# Patient Record
Sex: MALE | Race: BLACK OR AFRICAN AMERICAN | Employment: UNEMPLOYED | URBAN - METROPOLITAN AREA
[De-identification: names, ages, dates, MRNs, and addresses within clinical notes are randomized per-mention and may not be internally consistent; named-entity substitution may affect disease eponyms.]

---

## 2020-08-31 ENCOUNTER — TELEPHONE (OUTPATIENT)
Dept: INTERNAL MEDICINE CLINIC | Age: 70
End: 2020-08-31

## 2020-08-31 RX ORDER — PRAVASTATIN SODIUM 40 MG/1
40 TABLET ORAL
COMMUNITY
End: 2020-09-02

## 2020-08-31 RX ORDER — FOSINOPRIL SODIUM AND HYDROCHLOROTHIAZIDE 10; 12.5 MG/1; MG/1
TABLET ORAL
COMMUNITY
Start: 2020-05-28 | End: 2020-09-02

## 2020-09-01 ENCOUNTER — TELEPHONE (OUTPATIENT)
Dept: INTERNAL MEDICINE CLINIC | Age: 70
End: 2020-09-01

## 2020-09-01 DIAGNOSIS — E78.2 MIXED HYPERLIPIDEMIA: Primary | ICD-10-CM

## 2020-09-01 NOTE — TELEPHONE ENCOUNTER
Patient called and needs a refill on his pravastiatin 40mg tab sent to [facility] he has an appointment scheduled for 9/18/2020

## 2020-09-02 RX ORDER — PRAVASTATIN SODIUM 40 MG/1
TABLET ORAL
Qty: 90 TAB | Refills: 1 | Status: SHIPPED | OUTPATIENT
Start: 2020-09-02 | End: 2020-09-02 | Stop reason: SDUPTHER

## 2020-09-02 RX ORDER — PRAVASTATIN SODIUM 40 MG/1
40 TABLET ORAL
Qty: 30 TAB | Refills: 3 | Status: SHIPPED | OUTPATIENT
Start: 2020-09-02 | End: 2020-09-18 | Stop reason: SDUPTHER

## 2020-09-02 RX ORDER — FOSINOPRIL SODIUM AND HYDROCHLOROTHIAZIDE 10; 12.5 MG/1; MG/1
TABLET ORAL
Qty: 90 TAB | Refills: 1 | Status: SHIPPED | OUTPATIENT
Start: 2020-09-02 | End: 2020-09-18 | Stop reason: SDUPTHER

## 2020-09-18 ENCOUNTER — VIRTUAL VISIT (OUTPATIENT)
Dept: INTERNAL MEDICINE CLINIC | Age: 70
End: 2020-09-18
Payer: MEDICARE

## 2020-09-18 DIAGNOSIS — Z71.89 ADVANCED CARE PLANNING/COUNSELING DISCUSSION: ICD-10-CM

## 2020-09-18 DIAGNOSIS — E78.2 MIXED HYPERLIPIDEMIA: ICD-10-CM

## 2020-09-18 DIAGNOSIS — I10 ESSENTIAL HYPERTENSION: Primary | ICD-10-CM

## 2020-09-18 DIAGNOSIS — Z00.00 HEALTHCARE MAINTENANCE: ICD-10-CM

## 2020-09-18 PROCEDURE — 99442 PR PHYS/QHP TELEPHONE EVALUATION 11-20 MIN: CPT | Performed by: INTERNAL MEDICINE

## 2020-09-18 RX ORDER — FOSINOPRIL SODIUM AND HYDROCHLOROTHIAZIDE 10; 12.5 MG/1; MG/1
TABLET ORAL
Qty: 90 TAB | Refills: 3 | Status: SHIPPED | OUTPATIENT
Start: 2020-09-18 | End: 2021-10-19 | Stop reason: SDUPTHER

## 2020-09-18 RX ORDER — PRAVASTATIN SODIUM 40 MG/1
40 TABLET ORAL
Qty: 90 TAB | Refills: 3 | Status: SHIPPED | OUTPATIENT
Start: 2020-09-18 | End: 2021-10-19 | Stop reason: SDUPTHER

## 2020-09-18 NOTE — PROGRESS NOTES
I was at my office in [place], [place] while conducting this encounter. The patient is at home. Consent:  Patient and/or HIS/HER healthcare decision maker is aware that this patient-initiated Telehealth encounter is a billable service, with coverage as determined by patient's insurance carrier. Patient is aware that He/She may receive a bill and has provided verbal consent to proceed: Consent has been obtained within past 12 months of the date of this encounter. This virtual visit was conducted via Telephone    1. Essential hypertension  he reports blood pressures been controlled we will renew his Monopril but I am going to check a metabolic panel  - fosinopril-hydroCHLOROthiazide (MONOPRIL-HCT) 10-12.5 mg per tablet; TAKE 1 TABLET BY MOUTH DAILY  Dispense: 90 Tab; Refill: 3    2. Mixed hyperlipidemia  Check a lipid panel and will renew his statin Pravachol 40 mg daily  - LIPID PANEL; Future  - METABOLIC PANEL, COMPREHENSIVE; Future  - pravastatin (PRAVACHOL) 40 mg tablet; Take 1 Tab by mouth nightly. Dispense: 90 Tab; Refill: 3    3. Healthcare maintenance  He is due for both a hemoglobin A1c and a PSA screening. We also discussed the need for colonoscopy. He normally gets them done at the [place]. He is to let us know if he would prefer us to order it or if he is going to have it done at the [place]. Additionally in discussions regarding vaccinations he noted that normally he can get his vaccines free at the [place] and he may pursue this course  - HEMOGLOBIN A1C WITH EAG; Future  - PSA SCREENING (SCREENING)    4. Advanced care planning/counseling discussion  Spent approximately 3 minutes discussing advanced care planning he is a full code       Chief Complaint   Patient presents with    Medication Refill        HPI   This is a very pleasant 38-year-old gentleman with a history of hypertension hyperlipidemia who presents today for routine labs and follow-up. He reports he is doing well he has no complaints at all.   He has no chest pain shortness of breath nausea vomiting constipation diarrhea. He denies any muscle pains or aches and otherwise reports he is in great health    No current outpatient medications on file prior to visit. No current facility-administered medications on file prior to visit.          Patient Active Problem List   Diagnosis Code    Essential hypertension I10    Mixed hyperlipidemia E78.2             Total Time Spent on this Encounter: 12 minutes

## 2020-12-09 ENCOUNTER — TELEPHONE (OUTPATIENT)
Dept: INTERNAL MEDICINE CLINIC | Age: 70
End: 2020-12-09

## 2020-12-09 NOTE — TELEPHONE ENCOUNTER
Patient called and needs a refill on her losartatin 100 mg HTCZ 25 mg sent in to [facility] she has an appt scheduled for 12/15

## 2021-01-07 ENCOUNTER — HOSPITAL ENCOUNTER (OUTPATIENT)
Dept: LAB | Age: 71
Discharge: HOME | End: 2021-01-07

## 2021-01-07 DIAGNOSIS — E78.2 MIXED HYPERLIPIDEMIA: ICD-10-CM

## 2021-01-07 DIAGNOSIS — Z00.00 HEALTHCARE MAINTENANCE: ICD-10-CM

## 2021-01-08 LAB
ALBUMIN SERPL-MCNC: 4.6 G/DL (ref 3.7–4.7)
ALBUMIN/GLOB SERPL: 1.7 {RATIO} (ref 1.2–2.2)
ALP SERPL-CCNC: 59 IU/L (ref 39–117)
ALT SERPL-CCNC: 20 IU/L (ref 0–44)
AST SERPL-CCNC: 21 IU/L (ref 0–40)
BILIRUB SERPL-MCNC: 0.3 MG/DL (ref 0–1.2)
BUN SERPL-MCNC: 10 MG/DL (ref 8–27)
BUN/CREAT SERPL: 11 (ref 10–24)
CALCIUM SERPL-MCNC: 9.3 MG/DL (ref 8.6–10.2)
CHLORIDE SERPL-SCNC: 102 MMOL/L (ref 96–106)
CHOLEST SERPL-MCNC: 132 MG/DL (ref 100–199)
CO2 SERPL-SCNC: 23 MMOL/L (ref 20–29)
CREAT SERPL-MCNC: 0.93 MG/DL (ref 0.76–1.27)
EST. AVERAGE GLUCOSE BLD GHB EST-MCNC: 148 MG/DL
GLOBULIN SER CALC-MCNC: 2.7 G/DL (ref 1.5–4.5)
GLUCOSE SERPL-MCNC: 120 MG/DL (ref 65–99)
HBA1C MFR BLD: 6.8 % (ref 4.8–5.6)
HDLC SERPL-MCNC: 42 MG/DL
LDLC SERPL CALC-MCNC: 79 MG/DL (ref 0–99)
POTASSIUM SERPL-SCNC: 4.2 MMOL/L (ref 3.5–5.2)
PROT SERPL-MCNC: 7.3 G/DL (ref 6–8.5)
PSA SERPL-MCNC: 2.4 NG/ML (ref 0–4)
SODIUM SERPL-SCNC: 139 MMOL/L (ref 134–144)
TRIGL SERPL-MCNC: 48 MG/DL (ref 0–149)
VLDLC SERPL CALC-MCNC: 11 MG/DL (ref 5–40)

## 2021-01-15 ENCOUNTER — VIRTUAL VISIT (OUTPATIENT)
Dept: INTERNAL MEDICINE CLINIC | Age: 71
End: 2021-01-15
Payer: MEDICARE

## 2021-01-15 DIAGNOSIS — E11.65 TYPE 2 DIABETES MELLITUS WITH HYPERGLYCEMIA, WITHOUT LONG-TERM CURRENT USE OF INSULIN (HCC): Primary | ICD-10-CM

## 2021-01-15 PROCEDURE — 99443 PR PHYS/QHP TELEPHONE EVALUATION 21-30 MIN: CPT | Performed by: INTERNAL MEDICINE

## 2021-01-15 RX ORDER — GLIPIZIDE 5 MG/1
2.5 TABLET ORAL DAILY
Qty: 45 TAB | Refills: 1 | Status: SHIPPED | OUTPATIENT
Start: 2021-01-15 | End: 2021-07-21

## 2021-01-15 RX ORDER — GLIPIZIDE 5 MG/1
5 TABLET ORAL DAILY
Qty: 30 TAB | Refills: 2 | Status: SHIPPED | OUTPATIENT
Start: 2021-01-15 | End: 2021-01-15 | Stop reason: CLARIF

## 2021-01-15 NOTE — PROGRESS NOTES
I was at my office in [address] while conducting this encounter. The patient is at home. Consent:  Patient and/or HIS/HER healthcare decision maker is aware that this patient-initiated Telehealth encounter is a billable service, with coverage as determined by patient's insurance carrier. Patient is aware that He/She may receive a bill and has provided verbal consent to proceed: Consent has been obtained within past 12 months of the date of this encounter. This virtual visit was conducted via Telephone    1. Type 2 diabetes mellitus with hyperglycemia, without long-term current use of insulin (HCC)  Surprisingly in our discussions about diabetes the patient reports he had a hemoglobin A1c of 7. He was told his blood sugars were fine and he did not need any treatment. This is very confusing to me. We will go ahead and start low-dose Glucotrol at 2.5 mg daily. I am also can refer to diabetic education. We will check a urine microalbumin as well. He is already on an ACE inhibitor. Also on a statin  - MICROALBUMIN, UR, RAND W/ MICROALB/CREAT RATIO; Future  - REFERRAL TO DIABETIC EDUCATION  - glipiZIDE (GLUCOTROL) 5 mg tablet; Take 0.5 Tabs by mouth daily. Dispense: 45 Tab; Refill: 1       Chief Complaint   Patient presents with    Labs     discuss results        HPI   This is a very pleasant 13-year-old gentleman who presents today in follow-up for hemoglobin A1c of 6.8. He reports that he was told his blood sugars were a little bit high and evidently had a hemoglobin A1c at some point in the 7 range. He was never told he had diabetes. This would be a second elevated hemoglobin A1c thus giving him that diagnosis. He tries to watch his sugar intake and get regular exercise but admits he is not always treated. He denies any increased urinary frequency or increase in thirst.  He reports he has been overall doing really well and has been staying at home and avoiding people with COVID-19.   We had a very long conversation regarding diabetes and its impact on kidney function. I also told him that it was incredibly important to start limiting his sugar intake. Current Outpatient Medications on File Prior to Visit   Medication Sig Dispense Refill    pravastatin (PRAVACHOL) 40 mg tablet Take 1 Tab by mouth nightly. 90 Tab 3    fosinopril-hydroCHLOROthiazide (MONOPRIL-HCT) 10-12.5 mg per tablet TAKE 1 TABLET BY MOUTH DAILY 90 Tab 3     No current facility-administered medications on file prior to visit.          Patient Active Problem List   Diagnosis Code    Essential hypertension I10    Mixed hyperlipidemia E78.2             Total Time Spent on this Encounter: greater than 21 minutes spent

## 2021-01-15 NOTE — PROGRESS NOTES
Jimmy Yost presents today for   Chief Complaint   Patient presents with    Labs     discuss results       Depression Screening:  3 most recent [facility] [place] [address] Screens 1/15/2021   Little interest or pleasure in doing things Not at all   Feeling down, depressed, irritable, or hopeless Not at all   Total Score PHQ 2 0       Learning Assessment:  Learning Assessment 9/18/2020   PRIMARY LEARNER Patient   PRIMARY LANGUAGE ENGLISH   LEARNER PREFERENCE PRIMARY LISTENING   ANSWERED BY Patient   RELATIONSHIP SELF       Fall Risk  Fall Risk Assessment, last 12 mths 1/15/2021   Able to walk? Yes   Fall in past 12 months? 0   Do you feel unsteady? 0   Are you worried about falling 0       ADL  ADL Assessment 1/15/2021   Feeding yourself No Help Needed   Getting from bed to chair No Help Needed   Getting dressed No Help Needed   Bathing or showering No Help Needed   Walk across the room (includes cane/walker) No Help Needed   Using the telphone No Help Needed   Taking your medications No Help Needed   Preparing meals No Help Needed   Managing money (expenses/bills) No Help Needed   Moderately strenuous housework (laundry) No Help Needed   Shopping for personal items (toiletries/medicines) No Help Needed   Shopping for groceries No Help Needed   Driving No Help Needed   Climbing a flight of stairs No Help Needed   Getting to places beyond walking distances No Help Needed       Health Maintenance reviewed and discussed and ordered per Provider.     Health Maintenance Due   Topic Date Due    Hepatitis C Screening  1950    Foot Exam Q1  07/20/1958    MICROALBUMIN Q1  07/20/1958    Eye Exam Retinal or Dilated  07/20/1958    DTaP/Tdap/Td series (1 - Tdap) 07/20/1969    Shingrix Vaccine Age 50> (1 of 2) 07/20/1998    Colorectal Cancer Screening Combo  07/20/1998    GLAUCOMA SCREENING Q2Y  07/20/2013    Pneumococcal 65+ years (1 of 1 - PPSV23) 07/20/2013    Flu Vaccine (1) 09/01/2020    Medicare Yearly Exam  12/22/2020 .      Coordination of Care:  1. Have you been to the ER, urgent care clinic since your last visit? Hospitalized since your last visit? no    2. Have you seen or consulted any other health care providers outside of the [address] [place] [address] since your last visit? Include any pap smears or colon screening.  no

## 2021-03-04 ENCOUNTER — VIRTUAL VISIT (OUTPATIENT)
Dept: INTERNAL MEDICINE CLINIC | Age: 71
End: 2021-03-04

## 2021-03-04 DIAGNOSIS — I10 ESSENTIAL HYPERTENSION: Primary | ICD-10-CM

## 2021-03-04 PROBLEM — E11.9 CONTROLLED TYPE 2 DIABETES MELLITUS, WITHOUT LONG-TERM CURRENT USE OF INSULIN (HCC): Status: ACTIVE | Noted: 2021-03-04

## 2021-03-29 LAB — HBA1C MFR BLD HPLC: 5.7 %

## 2021-05-13 ENCOUNTER — TELEPHONE (OUTPATIENT)
Dept: PHARMACY | Age: 71
End: 2021-05-13

## 2021-05-13 NOTE — TELEPHONE ENCOUNTER
CLINICAL PHARMACY: ADHERENCE REVIEW  Identified care gap per [facility]; fills at [facility]: Statin adherence    Last Visit: 1/15/21    Patient also appears to be prescribed: ACEi, DM    Patient found in Outcomes MTM and is not currently eligible for CMR/TIP    ASSESSMENT  ACE/ARB ADHERENCE    Per Insurance Records through 5/3/21 (2020 [place] = not provided; YTD [place] = 96%; Potential Fail Date: 9/14/21): Fosinopril-HCTZ last filled on 4/10/21 for 90 day supply. Next refill due: 7/8/21    Per Outcomes MTM Records:   Fosinopril-HCTZ 10-12.5mg daily filled on 1/6/21 and 4/10/21 for 90 day supply. Per chart, last rx'd 9/18/20 for #90, 3 refills    BP Readings from Last 3 Encounters:   No data found for BP     CrCl cannot be calculated (Unknown ideal weight.). DIABETES ADHERENCE    Per Insurance Records through 5/3/21 ([address] [place] = not provided; YTD [place] = 99%; Potential Fail Date: 9/21/21):   Glipizide last filled on 4/16/21 for 90 day supply. Next refill due: 7/14/21    Per Outcomes MTM Records:   Glipizide 5mg, half tab daily, filled on 1/15/21 and 4/16/21 for 90 day supply. Per chart, likely 0 refills remain    Lab Results   Component Value Date/Time    Hemoglobin A1c 6.8 (H) 01/07/2021 12:00 AM    Hemoglobin A1c, External 5.7 03/29/2021     STATIN ADHERENCE    Per Insurance Records through 5/3/21 (2020 [place] = not provided; YTD [place] = 76%; Potential Fail Date: 6/16/21):   Pravastatin last filled on 1/6/21 for 90 day supply. Next refill due: 4/5/21    Per Outcomes MTM Records:   Pravastatin 40mg daily filled on 1/6/21 and 5/5/21 for 90 day supply.      Per chart, last rx'd 9/18/20 for #90, 3 refills    Lab Results   Component Value Date/Time    Cholesterol, total 132 01/07/2021 12:00 AM    HDL Cholesterol 42 01/07/2021 12:00 AM    LDL, calculated 79 01/07/2021 12:00 AM    VLDL, calculated 11 01/07/2021 12:00 AM    Triglyceride 48 01/07/2021 12:00 AM     ALT (SGPT)   Date Value Ref Range Status   01/07/2021 20 0 - 44 IU/L Final     AST (SGOT)   Date Value Ref Range Status   2021 21 0 - 40 IU/L Final     The ASCVD Risk score (Kellee Malik, et al., 2013) failed to calculate for the following reasons: The systolic blood pressure is missing     PLAN  The following are interventions that have been identified:  - Patient overdue refilling pravastatin (likely has supply now, but was @1 month past due for refill?)    Reached patient to review. Reports he did recently get more of his pravastatin, and may occasionally run out for a day or two before getting to the pharmacy. Denies ADRs. States he used to take it during the day, but noticed with his January prescription it said to take at night; he takes his other 2 medications earlier in the day. Reviewed that if night-time dosing may be causing him to miss/forget doses, it is best that he gets/takes the medication every day, if a different time works better for his routine. No future appointments.     Franky Pate, PharmD, [address]  Direct: [phone]  Department, toll free: [phone], option 7     =========================================================   For Pharmacy Admin Tracking Only     CPA in place: No   Recommendation Provided To: Patient/Caregiver: 1 via Telephone   Intervention Detail: Adherence Monitorin   Gap Closed?: Yes   Total # of Interventions Recommended: 1   Total # of Interventions Accepted: 1   Intervention Accepted By: Patient/Caregiver: 1   Time Spent (min): 15

## 2021-07-21 DIAGNOSIS — E11.65 TYPE 2 DIABETES MELLITUS WITH HYPERGLYCEMIA, WITHOUT LONG-TERM CURRENT USE OF INSULIN (HCC): ICD-10-CM

## 2021-07-21 RX ORDER — GLIPIZIDE 5 MG/1
TABLET ORAL
Qty: 45 TABLET | Refills: 1 | Status: SHIPPED | OUTPATIENT
Start: 2021-07-21 | End: 2021-09-08 | Stop reason: ALTCHOICE

## 2021-09-08 ENCOUNTER — OFFICE VISIT (OUTPATIENT)
Dept: INTERNAL MEDICINE CLINIC | Age: 71
End: 2021-09-08
Payer: MEDICARE

## 2021-09-08 VITALS
SYSTOLIC BLOOD PRESSURE: 134 MMHG | HEART RATE: 77 BPM | DIASTOLIC BLOOD PRESSURE: 71 MMHG | RESPIRATION RATE: 18 BRPM | WEIGHT: 168.8 LBS | BODY MASS INDEX: 25.58 KG/M2 | HEIGHT: 68 IN | OXYGEN SATURATION: 97 % | TEMPERATURE: 97.6 F

## 2021-09-08 DIAGNOSIS — Z12.5 SPECIAL SCREENING FOR MALIGNANT NEOPLASM OF PROSTATE: ICD-10-CM

## 2021-09-08 DIAGNOSIS — E11.65 CONTROLLED TYPE 2 DIABETES MELLITUS WITH HYPERGLYCEMIA, WITHOUT LONG-TERM CURRENT USE OF INSULIN (HCC): ICD-10-CM

## 2021-09-08 DIAGNOSIS — E78.2 MIXED HYPERLIPIDEMIA: ICD-10-CM

## 2021-09-08 DIAGNOSIS — Z13.39 SCREENING FOR ALCOHOLISM: ICD-10-CM

## 2021-09-08 DIAGNOSIS — Z00.00 MEDICARE ANNUAL WELLNESS VISIT, SUBSEQUENT: ICD-10-CM

## 2021-09-08 DIAGNOSIS — E11.65 TYPE 2 DIABETES MELLITUS WITH HYPERGLYCEMIA, WITHOUT LONG-TERM CURRENT USE OF INSULIN (HCC): Primary | ICD-10-CM

## 2021-09-08 DIAGNOSIS — I10 ESSENTIAL HYPERTENSION: ICD-10-CM

## 2021-09-08 DIAGNOSIS — Z13.31 SCREENING FOR DEPRESSION: ICD-10-CM

## 2021-09-08 DIAGNOSIS — Z11.59 NEED FOR HEPATITIS C SCREENING TEST: ICD-10-CM

## 2021-09-08 LAB
CREATININE, URINE POC: NORMAL MG/DL
HBA1C MFR BLD HPLC: 6.4 %
MICROALBUMIN UR TEST STR-MCNC: NORMAL MG/L
MICROALBUMIN/CREAT RATIO POC: <30 MG/G

## 2021-09-08 PROCEDURE — 99214 OFFICE O/P EST MOD 30 MIN: CPT | Performed by: INTERNAL MEDICINE

## 2021-09-08 PROCEDURE — 3017F COLORECTAL CA SCREEN DOC REV: CPT | Performed by: INTERNAL MEDICINE

## 2021-09-08 PROCEDURE — G8419 CALC BMI OUT NRM PARAM NOF/U: HCPCS | Performed by: INTERNAL MEDICINE

## 2021-09-08 PROCEDURE — 83036 HEMOGLOBIN GLYCOSYLATED A1C: CPT | Performed by: INTERNAL MEDICINE

## 2021-09-08 PROCEDURE — G8536 NO DOC ELDER MAL SCRN: HCPCS | Performed by: INTERNAL MEDICINE

## 2021-09-08 PROCEDURE — 3044F HG A1C LEVEL LT 7.0%: CPT | Performed by: INTERNAL MEDICINE

## 2021-09-08 PROCEDURE — 82044 UR ALBUMIN SEMIQUANTITATIVE: CPT | Performed by: INTERNAL MEDICINE

## 2021-09-08 PROCEDURE — 2022F DILAT RTA XM EVC RTNOPTHY: CPT | Performed by: INTERNAL MEDICINE

## 2021-09-08 PROCEDURE — G8427 DOCREV CUR MEDS BY ELIG CLIN: HCPCS | Performed by: INTERNAL MEDICINE

## 2021-09-08 PROCEDURE — G0439 PPPS, SUBSEQ VISIT: HCPCS | Performed by: INTERNAL MEDICINE

## 2021-09-08 PROCEDURE — 1101F PT FALLS ASSESS-DOCD LE1/YR: CPT | Performed by: INTERNAL MEDICINE

## 2021-09-08 PROCEDURE — G8752 SYS BP LESS 140: HCPCS | Performed by: INTERNAL MEDICINE

## 2021-09-08 PROCEDURE — G8754 DIAS BP LESS 90: HCPCS | Performed by: INTERNAL MEDICINE

## 2021-09-08 PROCEDURE — G8510 SCR DEP NEG, NO PLAN REQD: HCPCS | Performed by: INTERNAL MEDICINE

## 2021-09-08 RX ORDER — GLIPIZIDE 5 MG/1
2.5 TABLET ORAL DAILY
Qty: 30 TABLET | Refills: 5 | Status: SHIPPED | OUTPATIENT
Start: 2021-09-08 | End: 2022-09-06

## 2021-09-08 NOTE — PROGRESS NOTES
1. Medicare annual wellness visit, subsequent  Refer to annual wellness    2. Screening for alcoholism  Refer to annual wellness  - [address] 15 MIN    3. Screening for depression  Refer to annual wellness  - [place] [address]    4. Type 2 diabetes mellitus with hyperglycemia, without long-term current use of insulin [facility])  Mr. Jules Posey and I had a good conversation regarding his diabetes. I do have a reading of 6.8 and today's reading is 6.4. I had him on 2.5 mg of Glucotrol and he stopped taking it. He brought in a reading of 5.7 back in March which I believe was while he was on the Glucotrol. He has never had any hypoglycemic episodes before. I am going to go ahead and restart this. The patient does not tolerate Metformin.  - AMB POC HEMOGLOBIN A1C  - COLLECTION CAPILLARY BLOOD SPECIMEN  - REFERRAL TO PODIATRY  - AMB POC URINE, MICROALBUMIN, SEMIQUANTITATIVE  - glipiZIDE (GLUCOTROL) 5 mg tablet; Take 0.5 Tablets by mouth daily. Dispense: 30 Tablet; Refill: 5    6. Mixed hyperlipidemia  Continue pravastatin. He is due for a lipid profile and a CMP which I have ordered  - METABOLIC PANEL, COMPREHENSIVE  - LIPID PANEL    7. Special screening for malignant neoplasm of prostate  Medicare wellness  - PSA SCREENING (SCREENING)    8. Need for hepatitis C screening test  Medicare wellness  - HEPATITIS C AB    9. Essential Hypertension  Pressures well controlled today  - continue fosinpril/hctz  - CMP    Results for Estefani Snider (MRN [id number]) as of 9/8/2021 11:58   Ref.  Range 1/7/2021 00:00 3/29/2021 00:00 9/8/2021 11:37   Sodium Latest Ref Range: 134 - 144 mmol/L 139     Potassium Latest Ref Range: 3.5 - 5.2 mmol/L 4.2     Chloride Latest Ref Range: 96 - 106 mmol/L 102     CO2 Latest Ref Range: 20 - 29 mmol/L 23     Glucose Latest Ref Range: 65 - 99 mg/dL 120 (H)     BUN Latest Ref Range: 8 - 27 mg/dL 10     Creatinine Latest Ref Range: 0.76 - 1.27 mg/dL 0.93     BUN/Creatinine ratio Latest Ref Range: 10 - 24  11     Calcium Latest Ref Range: 8.6 - 10.2 mg/dL 9.3     GFR est non-AA Latest Ref Range: >59 mL/min/1.73 82     GFR est AA Latest Ref Range: >59 mL/min/1.73 94     Bilirubin, total Latest Ref Range: 0.0 - 1.2 mg/dL 0.3     Protein, total Latest Ref Range: 6.0 - 8.5 g/dL 7.3     Albumin Latest Ref Range: 3.7 - 4.7 g/dL 4.6     A-G Ratio Latest Ref Range: 1.2 - 2.2  1.7     ALT Latest Ref Range: 0 - 44 IU/L 20     AST Latest Ref Range: 0 - 40 IU/L 21     Alk. phosphatase Latest Ref Range: 39 - 117 IU/L 59     Triglyceride Latest Ref Range: 0 - 149 mg/dL 48     Cholesterol, total Latest Ref Range: 100 - 199 mg/dL 132     HDL Cholesterol Latest Ref Range: >39 mg/dL 42     VLDL, calculated Latest Ref Range: 5 - 40 mg/dL 11     LDL, calculated Latest Ref Range: 0 - 99 mg/dL 79     Hemoglobin A1c, (calculated) Latest Ref Range: 4.8 - 5.6 % 6.8 (H)     Hemoglobin A1c (POC) Latest Units: %   6.4   Estimated average glucose Latest Units: mg/dL 148     Prostate Specific Ag Latest Ref Range: 0.0 - 4.0 ng/mL 2.4     Hemoglobin A1c, External Latest Units: %  5.7      Chief Complaint   Patient presents with    Annual Wellness Visit        HPI   This is a very pleasant 41-year-old gentleman with a history of type 2 diabetes dyslipidemia and hypertension who presents for follow-up and for his Medicare wellness. The patient evidently was told by someone to discontinue his Glucotrol many months ago. He had a hemoglobin A1c done at the [place] [address] that was 5.7. After talking to [facility] it was fairly clear that this is while he was on Glucotrol. Repeat hemoglobin A1c in the office is 6.4. Of note he also reports he has been doing well his weight has been stable he has no headache vision changes chest pain palpitation shortness of breath or cough. He has absolutely no complaints.   Patient Active Problem List   Diagnosis Code    Essential hypertension I10    Mixed hyperlipidemia E78.2    Controlled type 2 diabetes mellitus, without long-term current use of insulin (HCC) E11.9        Current Outpatient Medications on File Prior to Visit   Medication Sig Dispense Refill    pravastatin (PRAVACHOL) 40 mg tablet Take 1 Tab by mouth nightly. 90 Tab 3    fosinopril-hydroCHLOROthiazide (MONOPRIL-HCT) 10-12.5 mg per tablet TAKE 1 TABLET BY MOUTH DAILY 90 Tab 3     No current facility-administered medications on file prior to visit. ROS  - GEN: no weight gain/loss, no fevers or chills  - HEENT: no vision changes, no tinnitus, no sore throat  - CV: no cp, palpitations or edema  - RESP: no sob, cough  - ABD: no n/v/d, no blood in stool  - : no dysuria or changes in freq. - SKIN: no rashes, ulcers  - Neuro: no resting tremors, parasthesia in extremities, no headaches  - MS: No weakness in extremities, no gait abnormalities  - Psych: negative for depression or anxiety      Visit Vitals  /71   Pulse 77   Temp 97.6 °F (36.4 °C)   Resp 18   Ht 5' 8\" (1.727 m)   Wt 168 lb 12.8 oz (76.6 kg)   SpO2 97%   BMI 25.67 kg/m²           Physical Exam  Constitutional:       Appearance: Normal appearance. normal. NAD and pleasant  HENT:      Head: Normocephalic. Nose: Nose normal.      Mouth/Throat:      Mouth: Mucous membranes are moist. Throat not inflammed  Eyes:      Extraocular Movements: Extraocular movements intact. Conjunctiva/sclera: Conjunctivae normal. Sclera anicteric       Cardiovascular:      Rate and Rhythm: Normal rate and regular rhythm. Pulses: Normal pulses. Pulmonary:      Effort: No respiratory distress. Breath sounds: CTAB and No stridor. No rhonchi. Abdominal:      General: There is no distension.  NT, ND

## 2021-09-08 NOTE — PROGRESS NOTES
Connor Barone. presents today for   Chief Complaint   Patient presents with    Annual Wellness Visit       Is someone accompanying this pt? no, no (name) no (relationship) (temp no)    Is the patient using any DME equipment during [address]? no    Depression Screening:  3 most recent PHQ Screens 9/8/2021   Little interest or pleasure in doing things Not at all   Feeling down, depressed, irritable, or hopeless Not at all   Total Score PHQ 2 0       Learning Assessment:  Learning Assessment 9/18/2020   PRIMARY LEARNER Patient   PRIMARY LANGUAGE ENGLISH   LEARNER PREFERENCE PRIMARY LISTENING   ANSWERED BY Patient   RELATIONSHIP SELF       Fall Risk  Fall Risk Assessment, last 12 mths 9/8/2021   Able to walk? Yes   Fall in past 12 months? 0   Do you feel unsteady? 0   Are you worried about falling 0       ADL  ADL Assessment 9/8/2021   Feeding yourself No Help Needed   Getting from bed to chair No Help Needed   Getting dressed No Help Needed   Bathing or showering No Help Needed   Walk across the room (includes cane/walker) No Help Needed   Using the telphone No Help Needed   Taking your medications No Help Needed   Preparing meals No Help Needed   Managing money (expenses/bills) No Help Needed   Moderately strenuous housework (laundry) No Help Needed   Shopping for personal items (toiletries/medicines) No Help Needed   Shopping for groceries No Help Needed   Driving No Help Needed   Climbing a flight of stairs No Help Needed   Getting to places beyond walking distances No Help Needed       Health Maintenance reviewed and discussed and ordered per Provider.     Health Maintenance Due   Topic Date Due    Hepatitis C Screening  Never done    Foot Exam Q1  Never done    MICROALBUMIN Q1  Never done    Eye Exam Retinal or Dilated  Never done    COVID-19 Vaccine (1) Never done    DTaP/Tdap/Td series (1 - Tdap) Never done    Colorectal Cancer Screening Combo  Never done    Shingrix Vaccine Age 50> (1 of 2) Never done  Pneumococcal 65+ years (1 of 1 - PPSV23) Never done    Medicare Yearly Exam  Never done    Flu Vaccine (1) Never done   . Coordination of Care:  1. Have you been to the ER, urgent care clinic since your last visit? Hospitalized since your last visit? no    2. Have you seen or consulted any other health care providers outside of the [address] [place] [address] since your last visit? Include any pap smears or colon screening. no     Annual Wellness Exam, performed 12 months or more after the Initial AWV or the last Subsequent AWV    I have reviewed the patient's medical history in detail and updated the computerized patient record. Assessment/Plan   Education and counseling provided:  Are appropriate based on today's review and evaluation    1. Medicare annual wellness visit, subsequent  2. Screening for alcoholism  -     [place] ANNUAL ALCOHOL SCREEN 15 MIN  3. Screening for depression  -     DEPRESSION SCREEN ANNUAL     The patient is due for his colonoscopy and he is in the process of making that appointment with the [place]. He is due for PSA as well as his hep C screen which he states he has never had. He is up-to-date on all vaccinations  Depression Risk Factor Screening:     3 most recent PHQ Screens 9/8/2021   Little interest or pleasure in doing things Not at all   Feeling down, depressed, irritable, or hopeless Not at all   Total Score PHQ 2 0       Alcohol Risk Screen    Do you average more than 1 drink per night or more than 7 drinks a week: No    In the past three months have you have had more than 4 drinks containing alcohol on one occasion: No        Functional Ability and Level of Safety:    Hearing: Hearing is good. Activities of Daily Living: The home contains: no safety equipment. Patient does total self care      Ambulation: with no difficulty     Fall Risk:  Fall Risk Assessment, last 12 mths 9/8/2021   Able to walk? Yes   Fall in past 12 months? 0   Do you feel unsteady?  0   Are you worried about falling 0      Abuse Screen:  Patient is not abused       Cognitive Screening    Has your family/caregiver stated any concerns about your memory: no    Cognitive Screening: Normal - Mini Cog Test    Health Maintenance Due     Health Maintenance Due   Topic Date Due    Hepatitis C Screening  Never done    Foot Exam Q1  Never done    MICROALBUMIN Q1  Never done    Eye Exam Retinal or Dilated  Never done    COVID-19 Vaccine (1) Never done    DTaP/Tdap/Td series (1 - Tdap) Never done    Colorectal Cancer Screening Combo  Never done    Shingrix Vaccine Age 50> (1 of 2) Never done    Pneumococcal 65+ years (1 of 1 - PPSV23) Never done    Flu Vaccine (1) Never done       Patient Care Team   Patient Care Team:  Iesha Nuñez MD as PCP - General (Internal Medicine)  Iesha Nuñez MD as PCP - [facility] Provider    History     Patient Active Problem List   Diagnosis Code    Essential hypertension I10    Mixed hyperlipidemia E78.2    Controlled type 2 diabetes mellitus, without long-term current use of insulin ([facility] E11.9     Past Medical History:   Diagnosis Date    Controlled type 2 diabetes mellitus, without long-term current use of insulin ([facility] 3/4/2021    HTN (hypertension)     Hyperlipidemia       History reviewed. No pertinent surgical history. Current Outpatient Medications   Medication Sig Dispense Refill    glipiZIDE (GLUCOTROL) 5 mg tablet TAKE 1/2 TABLET BY MOUTH DAILY 45 Tablet 1    pravastatin (PRAVACHOL) 40 mg tablet Take 1 Tab by mouth nightly.  90 Tab 3    fosinopril-hydroCHLOROthiazide (MONOPRIL-HCT) 10-12.5 mg per tablet TAKE 1 TABLET BY MOUTH DAILY 90 Tab 3     No Known Allergies    Family History   Problem Relation Age of Onset    Diabetes Mother     Other Father         Meningitis     Social History     Tobacco Use    Smoking status: Never Smoker    Smokeless tobacco: Never Used   Substance Use Topics    Alcohol use: Yes       Ana Hany Savage, who was evaluated through a in office visit and  is aware that it is a billable service, with coverage as determined by his insurance carrier. He provided verbal consent to proceed: Yes, and patient identification was verified. It was conducted pursuant to the emergency declaration under the [address] [place] [address], [address] [place] [address] authority and the [employer] Act. A caregiver was present when appropriate. Ability to conduct physical exam was limited. I was in the office. The patient was in the office.     Mauricio Jara LPN

## 2021-09-08 NOTE — PATIENT INSTRUCTIONS
Medicare Wellness Visit, Male    The best way to live healthy is to have a lifestyle where you eat a well-balanced diet, exercise regularly, limit alcohol use, and quit all forms of tobacco/nicotine, if applicable. Regular preventive services are another way to keep healthy. Preventive services (vaccines, screening tests, monitoring & exams) can help personalize your care plan, which helps you manage your own care. Screening tests can find health problems at the earliest stages, when they are easiest to treat. [facility] follows the current, evidence-based guidelines published by the [facility] ([facility]) when recommending preventive services for our patients. Because we follow these guidelines, sometimes recommendations change over time as research supports it. (For example, a prostate screening blood test is no longer routinely recommended for men with no symptoms). Of course, you and your doctor may decide to screen more often for some diseases, based on your risk and co-morbidities (chronic disease you are already diagnosed with). Preventive services for you include:  - Medicare offers their members a free annual wellness visit, which is time for you and your primary care provider to discuss and plan for your preventive service needs. Take advantage of this benefit every year!  -All adults over age 72 should receive the recommended pneumonia vaccines. Current USPSTF guidelines recommend a series of two vaccines for the best pneumonia protection.   -All adults should have a flu vaccine yearly and tetanus vaccine every 10 years.  -All adults age 48 and older should receive the shingles vaccines (series of two vaccines).        -All adults age 38-68 who are overweight should have a diabetes screening test once every three years.   -Other screening tests & preventive services for persons with diabetes include: an eye exam to screen for diabetic retinopathy, a kidney function test, a foot exam, and stricter control over your cholesterol.   -Cardiovascular screening for adults with routine risk involves an electrocardiogram (ECG) at intervals determined by the provider.   -Colorectal cancer screening should be done for adults age 54-65 with no increased risk factors for colorectal cancer. There are a number of acceptable methods of screening for this type of cancer. Each test has its own benefits and drawbacks. Discuss with your provider what is most appropriate for you during your annual wellness visit. The different tests include: colonoscopy (considered the best screening method), a fecal occult blood test, a fecal DNA test, and sigmoidoscopy.  -All adults born between [facility] should be screened once for Hepatitis C.  -An Abdominal Aortic Aneurysm (AAA) Screening is recommended for men age 73-68 who has ever smoked in their lifetime. Here is a list of your current Health Maintenance items (your personalized list of preventive services) with a due date:  Health Maintenance Due   Topic Date Due    Hepatitis C Test  Never done    Diabetic Foot Care  Never done    Albumin Urine Test  Never done    Eye Exam  Never done    COVID-19 Vaccine (1) Never done    DTaP/Tdap/Td  (1 - Tdap) Never done    Colorectal Screening  Never done    Shingles Vaccine (1 of 2) Never done    Pneumococcal Vaccine (1 of 1 - PPSV23) Never done    Yearly Flu Vaccine (1) Never done     Medicare Wellness Visit, Male    The best way to live healthy is to have a lifestyle where you eat a well-balanced diet, exercise regularly, limit alcohol use, and quit all forms of tobacco/nicotine, if applicable. Regular preventive services are another way to keep healthy. Preventive services (vaccines, screening tests, monitoring & exams) can help personalize your care plan, which helps you manage your own care.  Screening tests can find health problems at the earliest stages, when they are easiest to treat. [facility] follows the current, evidence-based guidelines published by the [facility] ([facility]) when recommending preventive services for our patients. Because we follow these guidelines, sometimes recommendations change over time as research supports it. (For example, a prostate screening blood test is no longer routinely recommended for men with no symptoms). Of course, you and your doctor may decide to screen more often for some diseases, based on your risk and co-morbidities (chronic disease you are already diagnosed with). Preventive services for you include:  - Medicare offers their members a free annual wellness visit, which is time for you and your primary care provider to discuss and plan for your preventive service needs. Take advantage of this benefit every year!  -All adults over age 72 should receive the recommended pneumonia vaccines. Current USPSTF guidelines recommend a series of two vaccines for the best pneumonia protection.   -All adults should have a flu vaccine yearly and tetanus vaccine every 10 years.  -All adults age 48 and older should receive the shingles vaccines (series of two vaccines). -All adults age 38-68 who are overweight should have a diabetes screening test once every three years.   -Other screening tests & preventive services for persons with diabetes include: an eye exam to screen for diabetic retinopathy, a kidney function test, a foot exam, and stricter control over your cholesterol.   -Cardiovascular screening for adults with routine risk involves an electrocardiogram (ECG) at intervals determined by the provider.   -Colorectal cancer screening should be done for adults age 54-65 with no increased risk factors for colorectal cancer. There are a number of acceptable methods of screening for this type of cancer. Each test has its own benefits and drawbacks.  Discuss with your provider what is most appropriate for you during your annual wellness visit. The different tests include: colonoscopy (considered the best screening method), a fecal occult blood test, a fecal DNA test, and sigmoidoscopy.  -All adults born between [facility] should be screened once for Hepatitis C.  -An Abdominal Aortic Aneurysm (AAA) Screening is recommended for men age 73-68 who has ever smoked in their lifetime.      Here is a list of your current Health Maintenance items (your personalized list of preventive services) with a due date:  Health Maintenance Due   Topic Date Due    Hepatitis C Test  Never done    Diabetic Foot Care  Never done    Albumin Urine Test  Never done    Eye Exam  Never done    DTaP/Tdap/Td  (1 - Tdap) Never done    Colorectal Screening  Never done    Shingles Vaccine (1 of 2) Never done    Pneumococcal Vaccine (1 of 1 - PPSV23) Never done    Yearly Flu Vaccine (1) Never done

## 2021-09-28 NOTE — TELEPHONE ENCOUNTER
done Hatchet Flap Text: The defect edges were debeveled with a #15 scalpel blade.  Given the location of the defect, shape of the defect and the proximity to free margins a hatchet flap was deemed most appropriate.  Using a sterile surgical marker, an appropriate hatchet flap was drawn incorporating the defect and placing the expected incisions within the relaxed skin tension lines where possible.    The area thus outlined was incised deep to adipose tissue with a #15 scalpel blade.  The skin margins were undermined to an appropriate distance in all directions utilizing iris scissors.

## 2021-09-29 ENCOUNTER — HOSPITAL ENCOUNTER (OUTPATIENT)
Dept: LAB | Age: 71
Discharge: HOME | End: 2021-09-29

## 2021-10-15 ENCOUNTER — TELEPHONE (OUTPATIENT)
Dept: INTERNAL MEDICINE CLINIC | Age: 71
End: 2021-10-15

## 2021-10-15 DIAGNOSIS — I10 ESSENTIAL HYPERTENSION: ICD-10-CM

## 2021-10-15 NOTE — TELEPHONE ENCOUNTER
Patient requesting refill on RX Fosinopril to [facility], patient stated the Rx requires a prior auth.   Patient stated he is out of the RX

## 2021-10-19 ENCOUNTER — TELEPHONE (OUTPATIENT)
Dept: INTERNAL MEDICINE CLINIC | Age: 71
End: 2021-10-19

## 2021-10-19 DIAGNOSIS — E78.2 MIXED HYPERLIPIDEMIA: ICD-10-CM

## 2021-10-19 RX ORDER — PRAVASTATIN SODIUM 40 MG/1
40 TABLET ORAL
Qty: 90 TABLET | Refills: 1 | Status: SHIPPED | OUTPATIENT
Start: 2021-10-19 | End: 2021-12-27

## 2021-10-19 RX ORDER — FOSINOPRIL SODIUM AND HYDROCHLOROTHIAZIDE 10; 12.5 MG/1; MG/1
TABLET ORAL
Qty: 90 TABLET | Refills: 1 | Status: SHIPPED | OUTPATIENT
Start: 2021-10-19 | End: 2022-04-26 | Stop reason: SDUPTHER

## 2021-12-24 DIAGNOSIS — E78.2 MIXED HYPERLIPIDEMIA: ICD-10-CM

## 2021-12-27 RX ORDER — PRAVASTATIN SODIUM 40 MG/1
TABLET ORAL
Qty: 90 TABLET | Refills: 1 | Status: SHIPPED | OUTPATIENT
Start: 2021-12-27 | End: 2022-03-23 | Stop reason: SDUPTHER

## 2021-12-27 RX ORDER — PRAVASTATIN SODIUM 40 MG/1
TABLET ORAL
Qty: 30 TABLET | Refills: 5 | Status: SHIPPED | OUTPATIENT
Start: 2021-12-27 | End: 2022-05-12 | Stop reason: SDUPTHER

## 2022-03-18 PROBLEM — I10 ESSENTIAL HYPERTENSION: Status: ACTIVE | Noted: 2020-09-18

## 2022-03-19 PROBLEM — E11.9 CONTROLLED TYPE 2 DIABETES MELLITUS, WITHOUT LONG-TERM CURRENT USE OF INSULIN (HCC): Status: ACTIVE | Noted: 2021-03-04

## 2022-03-19 PROBLEM — E78.2 MIXED HYPERLIPIDEMIA: Status: ACTIVE | Noted: 2020-09-18

## 2022-03-23 ENCOUNTER — OFFICE VISIT (OUTPATIENT)
Dept: FAMILY MEDICINE CLINIC | Age: 72
End: 2022-03-23
Payer: MEDICARE

## 2022-03-23 VITALS — SYSTOLIC BLOOD PRESSURE: 129 MMHG | HEART RATE: 75 BPM | OXYGEN SATURATION: 98 % | DIASTOLIC BLOOD PRESSURE: 76 MMHG

## 2022-03-23 DIAGNOSIS — H61.23 BILATERAL IMPACTED CERUMEN: Primary | ICD-10-CM

## 2022-03-23 DIAGNOSIS — E78.2 MIXED HYPERLIPIDEMIA: ICD-10-CM

## 2022-03-23 DIAGNOSIS — E11.65 TYPE 2 DIABETES MELLITUS WITH HYPERGLYCEMIA, WITHOUT LONG-TERM CURRENT USE OF INSULIN (HCC): ICD-10-CM

## 2022-03-23 DIAGNOSIS — I10 ESSENTIAL HYPERTENSION: ICD-10-CM

## 2022-03-23 LAB — HBA1C MFR BLD HPLC: 6.2 %

## 2022-03-23 PROCEDURE — G8419 CALC BMI OUT NRM PARAM NOF/U: HCPCS | Performed by: NURSE PRACTITIONER

## 2022-03-23 PROCEDURE — G8432 DEP SCR NOT DOC, RNG: HCPCS | Performed by: NURSE PRACTITIONER

## 2022-03-23 PROCEDURE — G8536 NO DOC ELDER MAL SCRN: HCPCS | Performed by: NURSE PRACTITIONER

## 2022-03-23 PROCEDURE — 83036 HEMOGLOBIN GLYCOSYLATED A1C: CPT | Performed by: NURSE PRACTITIONER

## 2022-03-23 PROCEDURE — 2022F DILAT RTA XM EVC RTNOPTHY: CPT | Performed by: NURSE PRACTITIONER

## 2022-03-23 PROCEDURE — G8427 DOCREV CUR MEDS BY ELIG CLIN: HCPCS | Performed by: NURSE PRACTITIONER

## 2022-03-23 PROCEDURE — 3017F COLORECTAL CA SCREEN DOC REV: CPT | Performed by: NURSE PRACTITIONER

## 2022-03-23 PROCEDURE — 1101F PT FALLS ASSESS-DOCD LE1/YR: CPT | Performed by: NURSE PRACTITIONER

## 2022-03-23 PROCEDURE — G8752 SYS BP LESS 140: HCPCS | Performed by: NURSE PRACTITIONER

## 2022-03-23 PROCEDURE — 99214 OFFICE O/P EST MOD 30 MIN: CPT | Performed by: NURSE PRACTITIONER

## 2022-03-23 PROCEDURE — 69210 REMOVE IMPACTED EAR WAX UNI: CPT | Performed by: NURSE PRACTITIONER

## 2022-03-23 PROCEDURE — G8754 DIAS BP LESS 90: HCPCS | Performed by: NURSE PRACTITIONER

## 2022-03-23 PROCEDURE — 3044F HG A1C LEVEL LT 7.0%: CPT | Performed by: NURSE PRACTITIONER

## 2022-03-23 RX ORDER — GLIPIZIDE 5 MG/1
2.5 TABLET ORAL DAILY
Qty: 30 TABLET | Refills: 5 | Status: CANCELLED | OUTPATIENT
Start: 2022-03-23

## 2022-03-23 RX ORDER — FOSINOPRIL SODIUM AND HYDROCHLOROTHIAZIDE 10; 12.5 MG/1; MG/1
TABLET ORAL
Qty: 90 TABLET | Refills: 1 | Status: CANCELLED | OUTPATIENT
Start: 2022-03-23

## 2022-03-23 RX ORDER — PRAVASTATIN SODIUM 40 MG/1
40 TABLET ORAL
Qty: 30 TABLET | Refills: 5 | Status: CANCELLED | OUTPATIENT
Start: 2022-03-23

## 2022-03-23 NOTE — PROGRESS NOTES
History of Present Illness  Ana Waite. is a 66 y.o. male who presents today for:    Chief Complaint   Patient presents with   [facility] Establish Care     establish care with provider      Past Medical History  Past Medical History:   Diagnosis Date    Controlled type 2 diabetes mellitus, without long-term current use of insulin ([address] 3/4/2021    HTN (hypertension)     Hyperlipidemia         Surgical History  History reviewed. No pertinent surgical history. Current Medications  Current Outpatient Medications   Medication Sig    pravastatin (PRAVACHOL) 40 mg tablet TAKE 1 TABLET BY MOUTH EVERY NIGHT    fosinopril-hydroCHLOROthiazide (MONOPRIL-HCT) 10-12.5 mg per tablet TAKE 1 TABLET BY MOUTH DAILY    glipiZIDE (GLUCOTROL) 5 mg tablet Take 0.5 Tablets by mouth daily.  pravastatin (PRAVACHOL) 40 mg tablet TAKE 1 TABLET BY MOUTH EVERY DAY (Patient not taking: Reported on 3/23/2022)     No current facility-administered medications for this visit. Allergies/Drug Reactions  No Known Allergies     Family History  Family History   Problem Relation Age of Onset    Diabetes Mother     Other Father         Meningitis        Social History  Social History     Tobacco Use    Smoking status: Never Smoker    Smokeless tobacco: Never Used   Vaping Use    Vaping Use: Never used   Substance Use Topics    Alcohol use:  Yes    Drug use: Not on file        Health Maintenance   Topic Date Due    Foot Exam Q1  Never done    Eye Exam Retinal or Dilated  Never done    DTaP/Tdap/Td series (1 - Tdap) Never done    Colorectal Cancer Screening Combo  Never done    Shingrix Vaccine Age 50> (1 of 2) Never done    Pneumococcal 65+ years (1 of 1 - PPSV23) Never done    Flu Vaccine (1) Never done    Depression Screen  09/08/2022    A1C test (Diabetic or Prediabetic)  09/08/2022    MICROALBUMIN Q1  09/08/2022    Medicare Yearly Exam  09/09/2022    Lipid Screen  09/30/2022    Hepatitis C Screening  Completed    COVID-19 Vaccine  Completed     Immunization History   Administered Date(s) Administered    COVID-19, Moderna Booster, PF, 0.25mL Dose 12/30/2021    COVID-19, Maulik Salinasl, Primary or Immunocompromised Series, MRNA, PF, 100mcg/0.5mL 04/24/2021, 05/22/2021     Physical Exam  Vital signs:   Vitals:    03/23/22 0926   BP: 129/76   Pulse: 75   SpO2: 98%     General: alert, oriented, not in distress  Head: scalp normal, atraumatic  Eyes: pupils are equal and reactive, full and intact EOM's  Ears: patent ear canal, intact tympanic membrane  Nose: normal turbinates, no congestion or discharge  Lips/Mouth: moist lips and buccal mucosa, non-enlarged tonsils, pink throat  Neck: supple, no JVD, no lymphadenopathy, non-palpable thyroid  Chest/Lungs: clear breath sounds, no wheezing or crackles  Heart: normal rate, regular rhythm, no murmur  Abdomen: soft, non-distended, non-tender, normal bowel sounds, no organomegaly, no masses  Extremities: no focal deformities, no edema  Skin: no active skin lesions    Laboratory/Tests:  No visits with results within 3 Month(s) from this visit. Latest known visit with results is:   Abstract on 11/03/2021   Component Date Value Ref Range Status    LDL-C, External 09/30/2021 78   Final    Creatinine, External 09/30/2021 0.95   Final    PSA, External 09/30/2021 2.3   Final     POC A1c=6.2 on 3/23/2022. Physical examination performed:  Bilateral ear tympanic membrane unable to visualize due cerumen impaction. Will perform bilateral ear cerumen disimpaction at this visit. Cardiac auscultation revealed no arrhythmias or murmurs. Bilateral lung sounds clear to auscultation in all fields. Abdomen soft and nontender, bowel sounds normoactive in all 4 quadrants. There was no observed bilateral lower extremity edema. Bilateral ear cerumen disimpaction performed with instrumentation at this visit successfully.   Tympanic membrane visualized on otoscopic examination subsequent to bilateral cerumen disimpaction revealed no abnormalities. Assessment/Plan:    1. Bilateral ear cerumen impaction. Successfully performed bilateral ear cerumen disimpaction with instrumentation at this visit. 2. Essential hypertension. Continue Fosinopril/HCTZ 10/12.5 mg tablet daily for management of essential hypertension. 3. Mixed hyperlipidemia. Continue Pravastatin 40 mg tablet nightly for management of mixed hyperlipidemia. 4. Type 2 diabetes. Continue Glipizide 5 mg tablet, take 1/2 tablet daily for management of type 2 diabetes. POC A1c = 6.2 on 3/23/2022. I have discussed the diagnosis with the patient and the intended plan as seen in the above orders. The patient has received an after-visit summary and questions were answered concerning future plans. I have discussed medication side effects and warnings with the patient as well. I have reviewed the plan of care with the patient, accepted their input and they are in agreement with the treatment goals.        Leonel Dorantes NP  March 23, 2022

## 2022-03-23 NOTE — PROGRESS NOTES
Conybenita Andre presents today for   Chief Complaint   Patient presents with   [facility] Establish Care     establish care with provider        Is someone accompanying this pt? No    Is the patient using any DME equipment during OV? No    Depression Screening:  3 most recent PHQ Screens 3/23/2022   Little interest or pleasure in doing things Not at all   Feeling down, depressed, irritable, or hopeless Not at all   Total Score PHQ 2 0       Learning Assessment:  Learning Assessment 9/18/2020   PRIMARY LEARNER Patient   PRIMARY LANGUAGE ENGLISH   LEARNER PREFERENCE PRIMARY LISTENING   ANSWERED BY Patient   RELATIONSHIP SELF       Fall Risk  Fall Risk Assessment, last 12 mths 3/23/2022   Able to walk? Yes   Fall in past 12 months? 0   Do you feel unsteady? 0   Are you worried about falling 0       ADL  ADL Assessment 9/8/2021   Feeding yourself No Help Needed   Getting from bed to chair No Help Needed   Getting dressed No Help Needed   Bathing or showering No Help Needed   Walk across the room (includes cane/walker) No Help Needed   Using the telphone No Help Needed   Taking your medications No Help Needed   Preparing meals No Help Needed   Managing money (expenses/bills) No Help Needed   Moderately strenuous housework (laundry) No Help Needed   Shopping for personal items (toiletries/medicines) No Help Needed   Shopping for groceries No Help Needed   Driving No Help Needed   Climbing a flight of stairs No Help Needed   Getting to places beyond walking distances No Help Needed       Travel Screening:    Travel Screening     Question   Response    In the last 10 days, have you been in contact with someone who was confirmed or suspected to have Coronavirus/COVID-19? No / Unsure    Have you had a COVID-19 viral test in the last 10 days? No    Do you have any of the following new or worsening symptoms? None of these    Have you traveled internationally or domestically in the last month?   No      Travel History   Travel since 02/23/22    No documented travel since 02/23/22         Health Maintenance reviewed and discussed and ordered per Provider. Health Maintenance Due   Topic Date Due    Foot Exam Q1  Never done    Eye Exam Retinal or Dilated  Never done    DTaP/Tdap/Td series (1 - Tdap) Never done    Colorectal Cancer Screening Combo  Never done    Shingrix Vaccine Age 50> (1 of 2) Never done    Pneumococcal 65+ years (1 of 1 - PPSV23) Never done    Flu Vaccine (1) Never done   . Coordination of Care:  1. \"Have you been to the ER, urgent care clinic since your last visit? Hospitalized since your last visit? \" No    2. \"Have you seen or consulted any other health care providers outside of the [address] [place] [address] since your last visit? \" No     3. For patients aged 39-70: Has the patient had a colonoscopy? Yes - Care Gap present. Most recent result on file     If the patient is female:    4. For patients aged 41-77: Has the patient had a mammogram within the past 2 years? NA - based on age/sex    5. For patients aged 21-65: Has the patient had a pap smear?  NA - based on age/sex

## 2022-04-26 DIAGNOSIS — I10 ESSENTIAL HYPERTENSION: ICD-10-CM

## 2022-04-26 RX ORDER — FOSINOPRIL SODIUM AND HYDROCHLOROTHIAZIDE 10; 12.5 MG/1; MG/1
TABLET ORAL
Qty: 90 TABLET | Refills: 1 | Status: SHIPPED | OUTPATIENT
Start: 2022-04-26 | End: 2022-10-27 | Stop reason: SDUPTHER

## 2022-04-26 NOTE — TELEPHONE ENCOUNTER
Patient has 1 left.     Requested Prescriptions     Pending Prescriptions Disp Refills    fosinopril-hydroCHLOROthiazide (MONOPRIL-HCT) 10-12.5 mg per tablet 90 Tablet 1     Sig: TAKE 1 TABLET BY MOUTH DAILY

## 2022-05-12 DIAGNOSIS — E78.2 MIXED HYPERLIPIDEMIA: ICD-10-CM

## 2022-05-12 RX ORDER — PRAVASTATIN SODIUM 40 MG/1
40 TABLET ORAL
Qty: 30 TABLET | Refills: 5 | Status: SHIPPED | OUTPATIENT
Start: 2022-05-12

## 2022-05-12 NOTE — TELEPHONE ENCOUNTER
Requested Prescriptions     Pending Prescriptions Disp Refills    pravastatin (PRAVACHOL) 40 mg tablet 30 Tablet 5     Sig: Take 1 Tablet by mouth nightly.

## 2022-09-06 DIAGNOSIS — E11.65 TYPE 2 DIABETES MELLITUS WITH HYPERGLYCEMIA, WITHOUT LONG-TERM CURRENT USE OF INSULIN (HCC): ICD-10-CM

## 2022-09-06 RX ORDER — GLIPIZIDE 5 MG/1
TABLET ORAL
Qty: 30 TABLET | Refills: 5 | Status: SHIPPED | OUTPATIENT
Start: 2022-09-06

## 2022-10-27 DIAGNOSIS — I10 ESSENTIAL HYPERTENSION: ICD-10-CM

## 2022-10-28 RX ORDER — FOSINOPRIL SODIUM AND HYDROCHLOROTHIAZIDE 10; 12.5 MG/1; MG/1
TABLET ORAL
Qty: 90 TABLET | Refills: 1 | Status: SHIPPED | OUTPATIENT
Start: 2022-10-28

## 2022-12-15 DIAGNOSIS — E78.2 MIXED HYPERLIPIDEMIA: ICD-10-CM

## 2022-12-15 RX ORDER — PRAVASTATIN SODIUM 40 MG/1
TABLET ORAL
Qty: 30 TABLET | Refills: 5 | Status: SHIPPED | OUTPATIENT
Start: 2022-12-15

## 2023-01-30 ENCOUNTER — TELEPHONE (OUTPATIENT)
Dept: FAMILY MEDICINE CLINIC | Age: 73
End: 2023-01-30

## 2023-01-30 DIAGNOSIS — I10 ESSENTIAL HYPERTENSION: Primary | ICD-10-CM

## 2023-01-30 RX ORDER — LISINOPRIL AND HYDROCHLOROTHIAZIDE 10; 12.5 MG/1; MG/1
1 TABLET ORAL DAILY
Qty: 90 TABLET | Refills: 1 | Status: SHIPPED | OUTPATIENT
Start: 2023-01-30

## 2023-01-30 NOTE — TELEPHONE ENCOUNTER
Pt came into the office stating his fosinopril-hydroCHLOROthiazide (MONOPRIL-HCT) 10-12.5 mg per tablet [[id number]]   Is being discontinued so he will need an alternative medication. He stated the pharmacy had sent a message over, I informed him we didn't receive anything. Pt will need this alternative by tomorrow because he will be out of his other pills. Please be advised.

## 2023-01-31 NOTE — TELEPHONE ENCOUNTER
Ordered Lisinopril/HCTZ 10/12.5 mg tablet daily to replace discontinued Lisinopril/HCTZ 10/12.5 mg tablet daily on 1/30/2023.

## 2023-07-03 RX ORDER — PRAVASTATIN SODIUM 40 MG
TABLET ORAL
Qty: 90 TABLET | Refills: 3 | Status: SHIPPED | OUTPATIENT
Start: 2023-07-03

## 2023-07-28 RX ORDER — LISINOPRIL AND HYDROCHLOROTHIAZIDE 12.5; 1 MG/1; MG/1
1 TABLET ORAL DAILY
Qty: 90 TABLET | Refills: 0 | Status: SHIPPED | OUTPATIENT
Start: 2023-07-28

## 2023-09-05 RX ORDER — GLIPIZIDE 5 MG/1
2.5 TABLET ORAL DAILY
Qty: 30 TABLET | Refills: 0 | Status: SHIPPED | OUTPATIENT
Start: 2023-09-05

## 2023-09-05 RX ORDER — GLIPIZIDE 5 MG/1
TABLET ORAL
Qty: 45 TABLET | OUTPATIENT
Start: 2023-09-05

## 2023-10-06 ENCOUNTER — OFFICE VISIT (OUTPATIENT)
Facility: CLINIC | Age: 73
End: 2023-10-06

## 2023-10-06 VITALS
OXYGEN SATURATION: 100 % | HEART RATE: 86 BPM | DIASTOLIC BLOOD PRESSURE: 71 MMHG | TEMPERATURE: 98.3 F | SYSTOLIC BLOOD PRESSURE: 133 MMHG | HEIGHT: 68 IN | RESPIRATION RATE: 18 BRPM | WEIGHT: 164.6 LBS | BODY MASS INDEX: 24.95 KG/M2

## 2023-10-06 DIAGNOSIS — E78.2 MIXED HYPERLIPIDEMIA: ICD-10-CM

## 2023-10-06 DIAGNOSIS — I10 ESSENTIAL HYPERTENSION: ICD-10-CM

## 2023-10-06 DIAGNOSIS — E11.65 TYPE 2 DIABETES MELLITUS WITH HYPERGLYCEMIA, WITHOUT LONG-TERM CURRENT USE OF INSULIN (HCC): ICD-10-CM

## 2023-10-06 DIAGNOSIS — H61.21 IMPACTED CERUMEN OF RIGHT EAR: ICD-10-CM

## 2023-10-06 DIAGNOSIS — R68.89 OTHER GENERAL SYMPTOMS AND SIGNS: ICD-10-CM

## 2023-10-06 DIAGNOSIS — Z12.5 ENCOUNTER FOR SCREENING FOR MALIGNANT NEOPLASM OF PROSTATE: ICD-10-CM

## 2023-10-06 DIAGNOSIS — Z00.00 MEDICARE ANNUAL WELLNESS VISIT, SUBSEQUENT: Primary | ICD-10-CM

## 2023-10-06 LAB — HBA1C MFR BLD: 6.2 %

## 2023-10-06 RX ORDER — GLIPIZIDE 5 MG/1
2.5 TABLET ORAL DAILY
Qty: 45 TABLET | Refills: 3 | Status: SHIPPED | OUTPATIENT
Start: 2023-10-06

## 2023-10-06 RX ORDER — LISINOPRIL AND HYDROCHLOROTHIAZIDE 12.5; 1 MG/1; MG/1
1 TABLET ORAL DAILY
Qty: 90 TABLET | Refills: 3 | Status: SHIPPED | OUTPATIENT
Start: 2023-10-06

## 2023-10-06 SDOH — ECONOMIC STABILITY: FOOD INSECURITY: WITHIN THE PAST 12 MONTHS, THE FOOD YOU BOUGHT JUST DIDN'T LAST AND YOU DIDN'T HAVE MONEY TO GET MORE.: NEVER TRUE

## 2023-10-06 SDOH — ECONOMIC STABILITY: HOUSING INSECURITY
IN THE LAST 12 MONTHS, WAS THERE A TIME WHEN YOU DID NOT HAVE A STEADY PLACE TO SLEEP OR SLEPT IN A SHELTER (INCLUDING NOW)?: NO

## 2023-10-06 SDOH — ECONOMIC STABILITY: INCOME INSECURITY: HOW HARD IS IT FOR YOU TO PAY FOR THE VERY BASICS LIKE FOOD, HOUSING, MEDICAL CARE, AND HEATING?: NOT VERY HARD

## 2023-10-06 SDOH — ECONOMIC STABILITY: FOOD INSECURITY: WITHIN THE PAST 12 MONTHS, YOU WORRIED THAT YOUR FOOD WOULD RUN OUT BEFORE YOU GOT MONEY TO BUY MORE.: NEVER TRUE

## 2023-10-06 ASSESSMENT — PATIENT HEALTH QUESTIONNAIRE - PHQ9
SUM OF ALL RESPONSES TO PHQ9 QUESTIONS 1 & 2: 0
SUM OF ALL RESPONSES TO PHQ QUESTIONS 1-9: 0
2. FEELING DOWN, DEPRESSED OR HOPELESS: 0
1. LITTLE INTEREST OR PLEASURE IN DOING THINGS: 0
SUM OF ALL RESPONSES TO PHQ QUESTIONS 1-9: 0

## 2023-10-06 ASSESSMENT — LIFESTYLE VARIABLES
HOW OFTEN DO YOU HAVE A DRINK CONTAINING ALCOHOL: MONTHLY OR LESS
HOW MANY STANDARD DRINKS CONTAINING ALCOHOL DO YOU HAVE ON A TYPICAL DAY: 1 OR 2

## 2023-10-06 NOTE — PROGRESS NOTES
Sindy Sam. presents today for No chief complaint on file. Is someone accompanying this pt? no    Is the patient using any DME equipment during OV? no    Health Maintenance reviewed and discussed and ordered per Provider. Health Maintenance Due   Topic Date Due    Pneumococcal 65+ years Vaccine (1 - PCV) Never done    Diabetic foot exam  Never done    Diabetic retinal exam  Never done    DTaP/Tdap/Td vaccine (1 - Tdap) Never done    Colorectal Cancer Screen  Never done    Shingles vaccine (1 of 2) Never done    Hepatitis B vaccine (1 of 3 - Risk 3-dose series) Never done    COVID-19 Vaccine (4 - Moderna series) 02/24/2022    Annual Wellness Visit (AWV)  09/09/2022    Lipids  09/30/2022    A1C test (Diabetic or Prediabetic)  03/23/2023    Depression Screen  03/23/2023    Flu vaccine (1) Never done   . Coordination of Care:  1. \"Have you been to the ER, urgent care clinic since your last visit? Hospitalized since your last visit? \" No    2. \"Have you seen or consulted any other health care providers outside of the [address] [place] [address] since your last visit? \" No    3. For patients aged 43-73: Has the patient had a colonoscopy? No    If the patient is female:    4. For patients aged 43-66: Has the patient had a mammogram within the past 2 years? N/A based on age/sex    5. For patients aged 21-65: Has the patient had a pap smear?  N/A based on age/sex

## 2023-10-06 NOTE — PROGRESS NOTES
History of Present Illness  Sepideh Jones is a 74 y.o. male who presents today for:    Chief Complaint   Patient presents with    Annual Exam       Past Medical History  Past Medical History:   Diagnosis Date    Controlled type 2 diabetes mellitus, without long-term current use of insulin ([address]) 3/4/2021    HTN (hypertension)     Hyperlipidemia         Surgical History  No past surgical history on file. Current Medications  Current Outpatient Medications   Medication Sig    glipiZIDE (GLUCOTROL) 5 MG tablet Take 0.5 tablets by mouth daily    lisinopril-hydroCHLOROthiazide (PRINZIDE;ZESTORETIC) 10-12.5 MG per tablet Take 1 tablet by mouth daily    pravastatin (PRAVACHOL) 40 MG tablet TAKE 1 TABLET BY MOUTH EVERY NIGHT     No current facility-administered medications for this visit.        Allergies/Drug Reactions  No Known Allergies     Family History  Family History   Problem Relation Age of Onset    Other Father         Meningitis    Diabetes Mother         Social History  Social History     Tobacco Use    Smoking status: Never    Smokeless tobacco: Never   Substance Use Topics    Alcohol use: Yes        Health Maintenance   Topic Date Due    Pneumococcal 65+ years Vaccine (1 - PCV) Never done    Diabetic foot exam  Never done    Diabetic retinal exam  Never done    DTaP/Tdap/Td vaccine (1 - Tdap) Never done    Colorectal Cancer Screen  Never done    Shingles vaccine (1 of 2) Never done    Hepatitis B vaccine (1 of 3 - Risk 3-dose series) Never done    COVID-19 Vaccine (4 - Moderna series) 02/24/2022    Annual Wellness Visit (AWV)  09/09/2022    Lipids  09/30/2022    Depression Screen  03/23/2023    Flu vaccine (1) 10/06/2024 (Originally 8/1/2023)    A1C test (Diabetic or Prediabetic)  10/06/2024    Hepatitis C screen  Completed    Hepatitis A vaccine  Aged Out    Hib vaccine  Aged Out    Meningococcal (ACWY) vaccine  Aged Out    Diabetic Alb to Cr ratio (uACR) test  Discontinued    GFR test (Diabetes, CKD

## 2024-04-08 ENCOUNTER — OFFICE VISIT (OUTPATIENT)
Facility: CLINIC | Age: 74
End: 2024-04-08
Payer: COMMERCIAL

## 2024-04-08 VITALS
HEART RATE: 75 BPM | DIASTOLIC BLOOD PRESSURE: 70 MMHG | TEMPERATURE: 97.6 F | RESPIRATION RATE: 18 BRPM | WEIGHT: 167.4 LBS | BODY MASS INDEX: 25.37 KG/M2 | SYSTOLIC BLOOD PRESSURE: 136 MMHG | HEIGHT: 68 IN | OXYGEN SATURATION: 100 %

## 2024-04-08 DIAGNOSIS — E11.65 TYPE 2 DIABETES MELLITUS WITH HYPERGLYCEMIA, WITHOUT LONG-TERM CURRENT USE OF INSULIN (HCC): ICD-10-CM

## 2024-04-08 DIAGNOSIS — Z12.12 ENCOUNTER FOR COLORECTAL CANCER SCREENING: ICD-10-CM

## 2024-04-08 DIAGNOSIS — Z12.5 ENCOUNTER FOR SCREENING FOR MALIGNANT NEOPLASM OF PROSTATE: ICD-10-CM

## 2024-04-08 DIAGNOSIS — Z12.11 ENCOUNTER FOR COLORECTAL CANCER SCREENING: ICD-10-CM

## 2024-04-08 DIAGNOSIS — R68.89 OTHER GENERAL SYMPTOMS AND SIGNS: ICD-10-CM

## 2024-04-08 DIAGNOSIS — I10 ESSENTIAL HYPERTENSION: Primary | ICD-10-CM

## 2024-04-08 DIAGNOSIS — H61.23 BILATERAL IMPACTED CERUMEN: ICD-10-CM

## 2024-04-08 DIAGNOSIS — E78.2 MIXED HYPERLIPIDEMIA: ICD-10-CM

## 2024-04-08 PROCEDURE — 1123F ACP DISCUSS/DSCN MKR DOCD: CPT | Performed by: NURSE PRACTITIONER

## 2024-04-08 PROCEDURE — 3078F DIAST BP <80 MM HG: CPT | Performed by: NURSE PRACTITIONER

## 2024-04-08 PROCEDURE — 99214 OFFICE O/P EST MOD 30 MIN: CPT | Performed by: NURSE PRACTITIONER

## 2024-04-08 PROCEDURE — 3075F SYST BP GE 130 - 139MM HG: CPT | Performed by: NURSE PRACTITIONER

## 2024-04-08 PROCEDURE — 69210 REMOVE IMPACTED EAR WAX UNI: CPT | Performed by: NURSE PRACTITIONER

## 2024-04-08 ASSESSMENT — PATIENT HEALTH QUESTIONNAIRE - PHQ9
SUM OF ALL RESPONSES TO PHQ QUESTIONS 1-9: 0
SUM OF ALL RESPONSES TO PHQ9 QUESTIONS 1 & 2: 0
SUM OF ALL RESPONSES TO PHQ QUESTIONS 1-9: 0
2. FEELING DOWN, DEPRESSED OR HOPELESS: NOT AT ALL
SUM OF ALL RESPONSES TO PHQ QUESTIONS 1-9: 0
1. LITTLE INTEREST OR PLEASURE IN DOING THINGS: NOT AT ALL
SUM OF ALL RESPONSES TO PHQ QUESTIONS 1-9: 0

## 2024-04-08 NOTE — PROGRESS NOTES
Sepideh Morrissey Jr. presents today for   Chief Complaint   Patient presents with    Follow-up     6m follow up        Is someone accompanying this pt? no    Is the patient using any DME equipment during OV? no    Health Maintenance reviewed and discussed and ordered per Provider.    Health Maintenance Due   Topic Date Due    Pneumococcal 65+ years Vaccine (1 of 2 - PCV) Never done    Diabetic foot exam  Never done    Diabetic retinal exam  Never done    DTaP/Tdap/Td vaccine (1 - Tdap) Never done    Colorectal Cancer Screen  Never done    Shingles vaccine (1 of 2) Never done    Respiratory Syncytial Virus (RSV) Pregnant or age 60 yrs+ (1 - 1-dose 60+ series) Never done    Lipids  09/30/2022    COVID-19 Vaccine (4 - 2023-24 season) 09/01/2023   .      \"Have you been to the ER, urgent care clinic since your last visit?  Hospitalized since your last visit?\"    NO    “Have you seen or consulted any other health care providers outside of [facility] since your last visit?”    NO    “Have you had a colorectal cancer screening such as a colonoscopy/FIT/Cologuard?    NO    No colonoscopy on file  No cologuard on file  No FIT/FOBT on file   No flexible sigmoidoscopy on file              
performed bilateral ear cerumen disimpaction (Gentle forceps) at this visit.  Encounter for colorectal cancer screening.  Ordered Cologuard at this visit.      I have discussed the diagnosis with the patient and the intended plan as seen in the above orders.  The patient has received an after-visit summary and questions were answered concerning future plans.  I have discussed medication side effects and warnings with the patient as well. I have reviewed the plan of care with the patient, accepted their input and they are in agreement with the treatment goals.       Negrito Manley, APRN - NP  April 8, 2024

## 2024-04-12 ENCOUNTER — HOSPITAL ENCOUNTER (OUTPATIENT)
Age: 74
Discharge: HOME | End: 2024-04-15

## 2024-04-12 LAB — LABCORP DRAW FEE: NORMAL

## 2024-04-12 PROCEDURE — 99001 SPECIMEN HANDLING PT-LAB: CPT

## 2024-04-13 LAB
ALBUMIN SERPL-MCNC: 4.8 G/DL (ref 3.8–4.8)
ALBUMIN/CREAT UR: 2 MG/G CREAT (ref 0–29)
ALBUMIN/GLOB SERPL: 1.8 {RATIO} (ref 1.2–2.2)
ALP SERPL-CCNC: 61 IU/L (ref 44–121)
ALT SERPL-CCNC: 22 IU/L (ref 0–44)
AST SERPL-CCNC: 17 IU/L (ref 0–40)
BASOPHILS # BLD AUTO: 0 X10E3/UL (ref 0–0.2)
BASOPHILS NFR BLD AUTO: 1 %
BILIRUB SERPL-MCNC: 0.3 MG/DL (ref 0–1.2)
BUN SERPL-MCNC: 9 MG/DL (ref 8–27)
BUN/CREAT SERPL: 10 (ref 10–24)
CALCIUM SERPL-MCNC: 9.2 MG/DL (ref 8.6–10.2)
CHLORIDE SERPL-SCNC: 102 MMOL/L (ref 96–106)
CHOLEST SERPL-MCNC: 126 MG/DL (ref 100–199)
CO2 SERPL-SCNC: 20 MMOL/L (ref 20–29)
CREAT SERPL-MCNC: 0.91 MG/DL (ref 0.76–1.27)
CREAT UR-MCNC: 139.8 MG/DL
EGFRCR SERPLBLD CKD-EPI 2021: 88 ML/MIN/1.73
EOSINOPHIL # BLD AUTO: 0 X10E3/UL (ref 0–0.4)
EOSINOPHIL NFR BLD AUTO: 1 %
ERYTHROCYTE [DISTWIDTH] IN BLOOD BY AUTOMATED COUNT: 12.9 % (ref 11.6–15.4)
GLOBULIN SER CALC-MCNC: 2.7 G/DL (ref 1.5–4.5)
GLUCOSE SERPL-MCNC: 102 MG/DL (ref 70–99)
HBA1C MFR BLD: 6.9 % (ref 4.8–5.6)
HCT VFR BLD AUTO: 44.8 % (ref 37.5–51)
HDLC SERPL-MCNC: 41 MG/DL
HGB BLD-MCNC: 14.6 G/DL (ref 13–17.7)
IMM GRANULOCYTES # BLD AUTO: 0 X10E3/UL (ref 0–0.1)
IMM GRANULOCYTES NFR BLD AUTO: 0 %
LDLC SERPL CALC-MCNC: 72 MG/DL (ref 0–99)
LYMPHOCYTES # BLD AUTO: 2 X10E3/UL (ref 0.7–3.1)
LYMPHOCYTES NFR BLD AUTO: 49 %
MCH RBC QN AUTO: 27.9 PG (ref 26.6–33)
MCHC RBC AUTO-ENTMCNC: 32.6 G/DL (ref 31.5–35.7)
MCV RBC AUTO: 86 FL (ref 79–97)
MICROALBUMIN UR-MCNC: 3.2 UG/ML
MONOCYTES # BLD AUTO: 0.5 X10E3/UL (ref 0.1–0.9)
MONOCYTES NFR BLD AUTO: 13 %
NEUTROPHILS # BLD AUTO: 1.4 X10E3/UL (ref 1.4–7)
NEUTROPHILS NFR BLD AUTO: 36 %
PLATELET # BLD AUTO: 273 X10E3/UL (ref 150–450)
POTASSIUM SERPL-SCNC: 4.1 MMOL/L (ref 3.5–5.2)
PROT SERPL-MCNC: 7.5 G/DL (ref 6–8.5)
PSA SERPL-MCNC: 2.4 NG/ML (ref 0–4)
RBC # BLD AUTO: 5.24 X10E6/UL (ref 4.14–5.8)
SODIUM SERPL-SCNC: 140 MMOL/L (ref 134–144)
SPECIMEN STATUS REPORT: NORMAL
TRIGL SERPL-MCNC: 63 MG/DL (ref 0–149)
TSH SERPL DL<=0.005 MIU/L-ACNC: 0.95 UIU/ML (ref 0.45–4.5)
VIT B12 SERPL-MCNC: 754 PG/ML (ref 232–1245)
VLDLC SERPL CALC-MCNC: 13 MG/DL (ref 5–40)
WBC # BLD AUTO: 4 X10E3/UL (ref 3.4–10.8)

## 2024-05-29 ENCOUNTER — APPOINTMENT (OUTPATIENT)
Age: 74
End: 2024-05-29
Payer: MEDICARE

## 2024-05-29 ENCOUNTER — HOSPITAL ENCOUNTER (EMERGENCY)
Age: 74
Discharge: HOME | End: 2024-05-29
Attending: EMERGENCY MEDICINE
Payer: MEDICARE

## 2024-05-29 VITALS
HEART RATE: 78 BPM | TEMPERATURE: 97.9 F | BODY MASS INDEX: 25.76 KG/M2 | OXYGEN SATURATION: 99 % | HEIGHT: 68 IN | WEIGHT: 170 LBS | SYSTOLIC BLOOD PRESSURE: 142 MMHG | DIASTOLIC BLOOD PRESSURE: 73 MMHG | RESPIRATION RATE: 19 BRPM

## 2024-05-29 DIAGNOSIS — M25.511 RIGHT SHOULDER PAIN, UNSPECIFIED CHRONICITY: Primary | ICD-10-CM

## 2024-05-29 PROCEDURE — 73030 X-RAY EXAM OF SHOULDER: CPT

## 2024-05-29 PROCEDURE — 96372 THER/PROPH/DIAG INJ SC/IM: CPT

## 2024-05-29 PROCEDURE — 6360000002 HC RX W HCPCS: Performed by: EMERGENCY MEDICINE

## 2024-05-29 PROCEDURE — 99284 EMERGENCY DEPT VISIT MOD MDM: CPT

## 2024-05-29 RX ORDER — KETOROLAC TROMETHAMINE 30 MG/ML
30 INJECTION, SOLUTION INTRAMUSCULAR; INTRAVENOUS
Status: COMPLETED | OUTPATIENT
Start: 2024-05-29 | End: 2024-05-29

## 2024-05-29 RX ORDER — KETOROLAC TROMETHAMINE 10 MG/1
10 TABLET, FILM COATED ORAL EVERY 8 HOURS PRN
Qty: 15 TABLET | Refills: 0 | Status: SHIPPED | OUTPATIENT
Start: 2024-05-29

## 2024-05-29 RX ADMIN — KETOROLAC TROMETHAMINE 30 MG: 30 INJECTION, SOLUTION INTRAMUSCULAR at 11:54

## 2024-05-29 NOTE — ED TRIAGE NOTES
Patient presents to the ed with right shoulder pain. 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 Patient states that the pain started later after the work. Patient complains of pain with movement. Care ongoing.

## 2024-08-19 RX ORDER — PRAVASTATIN SODIUM 40 MG
TABLET ORAL
Qty: 90 TABLET | Refills: 1 | Status: SHIPPED | OUTPATIENT
Start: 2024-08-19

## 2024-10-18 DIAGNOSIS — E11.65 TYPE 2 DIABETES MELLITUS WITH HYPERGLYCEMIA, WITHOUT LONG-TERM CURRENT USE OF INSULIN (HCC): ICD-10-CM

## 2024-10-18 RX ORDER — GLIPIZIDE 5 MG/1
TABLET ORAL
Qty: 45 TABLET | Refills: 1 | Status: SHIPPED | OUTPATIENT
Start: 2024-10-18

## 2024-10-21 DIAGNOSIS — I10 ESSENTIAL HYPERTENSION: ICD-10-CM

## 2024-10-21 RX ORDER — LISINOPRIL AND HYDROCHLOROTHIAZIDE 10; 12.5 MG/1; MG/1
1 TABLET ORAL DAILY
Qty: 90 TABLET | Refills: 1 | Status: SHIPPED | OUTPATIENT
Start: 2024-10-21

## 2024-12-02 RX ORDER — PRAVASTATIN SODIUM 40 MG
TABLET ORAL
Qty: 90 TABLET | Refills: 1 | Status: SHIPPED | OUTPATIENT
Start: 2024-12-02

## 2025-01-16 NOTE — ED PROVIDER NOTES
"Recommended To-Do List      Prepared on: Jan 16, 2025       You can get the best results from your medications by completing the items on this \"To-Do List.\"      Bring your To-Do List when you go to your doctor. And, share it with your family or caregivers.    My To-Do List:  What we talked about: What I should do:    What my medicines are for, how to know if my medicines are working, made sure my medicines are safe for me and reviewed how to take my medicines.      Take my medicines every day                " [facility] EMERGENCY DEPT  EMERGENCY DEPARTMENT ENCOUNTER       Pt Name: Sepideh Morrissey Jr.  MRN: [id number]  Birthdate 1950  Date of evaluation: 5/29/2024  Provider: Jalyn Griffith MD   PCP: Negrito Manley, GARY - NP  Note Started: 6:15 AM 5/29/24     CHIEF COMPLAINT       No chief complaint on file.       HISTORY OF PRESENT ILLNESS: 1 or more elements      History From: Patient  History limited by: Nothing     Sepideh Morrissey Jr. is a 73 y.o. male who presents to ED complaining of right shoulder pain which began 4 days ago while working on pool.  Pain worse with movement.  He denies any previous injury to shoulder.  Rates the pain 8 out of 10 when present.  Pain also worse when he lies on his right side.     Nursing Notes were all reviewed and agreed with or any disagreements were addressed in the HPI.     REVIEW OF SYSTEMS      Review of Systems     Positives and Pertinent negatives as per HPI.    PAST HISTORY     Past Medical History:  Past Medical History:   Diagnosis Date    Controlled type 2 diabetes mellitus, without long-term current use of insulin ([facility]) 3/4/2021    HTN (hypertension)     Hyperlipidemia        Past Surgical History:  No past surgical history on file.    Family History:  Family History   Problem Relation Age of Onset    Other Father         Meningitis    Diabetes Mother        Social History:  Social History     Tobacco Use    Smoking status: Never    Smokeless tobacco: Never   Substance Use Topics    Alcohol use: Yes       Allergies:  No Known Allergies    CURRENT MEDICATIONS      Discharge Medication List as of 5/29/2024 11:57 AM        CONTINUE these medications which have NOT CHANGED    Details   glipiZIDE (GLUCOTROL) 5 MG tablet Take 0.5 tablets by mouth daily, Disp-45 tablet, R-3Normal      lisinopril-hydroCHLOROthiazide (PRINZIDE;ZESTORETIC) 10-12.5 MG per tablet Take 1 tablet by mouth daily, Disp-90 tablet, R-3Normal      pravastatin (PRAVACHOL) 40 MG tablet TAKE 1 TABLET

## 2025-03-11 RX ORDER — PRAVASTATIN SODIUM 40 MG
40 TABLET ORAL NIGHTLY
Qty: 90 TABLET | Refills: 0 | Status: SHIPPED | OUTPATIENT
Start: 2025-03-11 | End: 2025-03-14 | Stop reason: SDUPTHER

## 2025-03-14 ENCOUNTER — OFFICE VISIT (OUTPATIENT)
Facility: CLINIC | Age: 75
End: 2025-03-14
Payer: MEDICARE

## 2025-03-14 VITALS
BODY MASS INDEX: 25.22 KG/M2 | WEIGHT: 166.4 LBS | SYSTOLIC BLOOD PRESSURE: 136 MMHG | HEIGHT: 68 IN | RESPIRATION RATE: 18 BRPM | HEART RATE: 80 BPM | TEMPERATURE: 98.2 F | OXYGEN SATURATION: 100 % | DIASTOLIC BLOOD PRESSURE: 83 MMHG

## 2025-03-14 DIAGNOSIS — I10 ESSENTIAL HYPERTENSION: ICD-10-CM

## 2025-03-14 DIAGNOSIS — E11.65 TYPE 2 DIABETES MELLITUS WITH HYPERGLYCEMIA, WITHOUT LONG-TERM CURRENT USE OF INSULIN (HCC): ICD-10-CM

## 2025-03-14 DIAGNOSIS — R68.89 OTHER GENERAL SYMPTOMS AND SIGNS: ICD-10-CM

## 2025-03-14 DIAGNOSIS — Z12.5 ENCOUNTER FOR SCREENING FOR MALIGNANT NEOPLASM OF PROSTATE: ICD-10-CM

## 2025-03-14 DIAGNOSIS — Z00.00 MEDICARE ANNUAL WELLNESS VISIT, SUBSEQUENT: Primary | ICD-10-CM

## 2025-03-14 DIAGNOSIS — E78.2 MIXED HYPERLIPIDEMIA: ICD-10-CM

## 2025-03-14 LAB — HBA1C MFR BLD: 6.8 %

## 2025-03-14 PROCEDURE — 1123F ACP DISCUSS/DSCN MKR DOCD: CPT | Performed by: NURSE PRACTITIONER

## 2025-03-14 PROCEDURE — 1160F RVW MEDS BY RX/DR IN RCRD: CPT | Performed by: NURSE PRACTITIONER

## 2025-03-14 PROCEDURE — 3075F SYST BP GE 130 - 139MM HG: CPT | Performed by: NURSE PRACTITIONER

## 2025-03-14 PROCEDURE — 83036 HEMOGLOBIN GLYCOSYLATED A1C: CPT | Performed by: NURSE PRACTITIONER

## 2025-03-14 PROCEDURE — 3044F HG A1C LEVEL LT 7.0%: CPT | Performed by: NURSE PRACTITIONER

## 2025-03-14 PROCEDURE — 99214 OFFICE O/P EST MOD 30 MIN: CPT | Performed by: NURSE PRACTITIONER

## 2025-03-14 PROCEDURE — G0439 PPPS, SUBSEQ VISIT: HCPCS | Performed by: NURSE PRACTITIONER

## 2025-03-14 PROCEDURE — 3079F DIAST BP 80-89 MM HG: CPT | Performed by: NURSE PRACTITIONER

## 2025-03-14 PROCEDURE — 1126F AMNT PAIN NOTED NONE PRSNT: CPT | Performed by: NURSE PRACTITIONER

## 2025-03-14 PROCEDURE — 1159F MED LIST DOCD IN RCRD: CPT | Performed by: NURSE PRACTITIONER

## 2025-03-14 RX ORDER — PRAVASTATIN SODIUM 40 MG
40 TABLET ORAL NIGHTLY
Qty: 90 TABLET | Refills: 3 | Status: SHIPPED | OUTPATIENT
Start: 2025-03-14

## 2025-03-14 SDOH — ECONOMIC STABILITY: FOOD INSECURITY: WITHIN THE PAST 12 MONTHS, THE FOOD YOU BOUGHT JUST DIDN'T LAST AND YOU DIDN'T HAVE MONEY TO GET MORE.: NEVER TRUE

## 2025-03-14 SDOH — ECONOMIC STABILITY: FOOD INSECURITY: WITHIN THE PAST 12 MONTHS, YOU WORRIED THAT YOUR FOOD WOULD RUN OUT BEFORE YOU GOT MONEY TO BUY MORE.: NEVER TRUE

## 2025-03-14 ASSESSMENT — PATIENT HEALTH QUESTIONNAIRE - PHQ9
SUM OF ALL RESPONSES TO PHQ QUESTIONS 1-9: 0
1. LITTLE INTEREST OR PLEASURE IN DOING THINGS: NOT AT ALL
2. FEELING DOWN, DEPRESSED OR HOPELESS: NOT AT ALL
SUM OF ALL RESPONSES TO PHQ QUESTIONS 1-9: 0

## 2025-03-14 NOTE — PROGRESS NOTES
Sepideh Morrissey Jr. presents today for   Chief Complaint   Patient presents with    Follow-up     6m follow up.  Pt reports his legs are getting stiff.    Medicare AWV       Is someone accompanying this pt? no    Is the patient using any DME equipment during OV? no    Risk Factor Screenings with Interventions     Fall Risk:  2 or more falls in past year?: no  Fall with injury in past year?: no    Alcohol:  Alcohol Use: Not At Risk (3/14/2025)    AUDIT-C     Frequency of Alcohol Consumption: Monthly or less     Average Number of Drinks: 1 or 2     Frequency of Binge Drinking: Less than monthly       Depression:  PHQ-2 Score: 0    Cognitive:  Clock Drawing Test (CDT): Normal  Words recalled: 3 Words Recalled  Total Score: 5  Total Score Interpretation: Normal Mini-Cog    Health Risk Assessment:     General  In general, how would you say your health is?: Good  In the past 7 days, have you experienced any of the following: New or Increased Pain, New or Increased Fatigue, Loneliness, Social Isolation, Stress or Anger?: No      Health Habits/Nutrition  On average, how many days per week do you engage in moderate to strenuous exercise (like a brisk walk)?: 0 days  On average, how many minutes do you engage in exercise at this level?: 0 min  Do you eat balanced/healthy meals regularly?: Yes  Have you seen the dentist within the past year?: (!) No  Body mass index is 25.3 kg/m².      Hearing/Vision  Have you had an eye exam within the past year?: Yes  Do you have difficulty driving, watching TV, or doing any of your daily activities because of your eyesight?: No  Do you or your family notice any trouble with your hearing that hasn't been managed with hearing aids?: No      Safety  Do you have working smoke detectors?: Yes  Do you have any tripping hazards - loose or unsecured carpets or rugs?: No  Do you have non-slip mats or non-slip surfaces or shower bars or grab bars in your shower or bathtub?: Yes  Do you always fasten

## 2025-03-14 NOTE — PROGRESS NOTES
Fingerstick for HBA1C done in right hand first finger by Medardo Griffith MA per order of Negrito Manley NP after cleaning area with alcohol wipe.  Patient tolerated procedure well.

## 2025-03-14 NOTE — PATIENT INSTRUCTIONS
Learning About Being Active as an Older Adult  Why is being active important as you get older?     Being active is one of the best things you can do for your health. And it's never too late to start. Being active--or getting active, if you aren't already--has definite benefits. It can:  Give you more energy,  Keep your mind sharp.  Improve balance to reduce your risk of falls.  Help you manage chronic illness with fewer medicines.  No matter how old you are, how fit you are, or what health problems you have, there is a form of activity that will work for you. And the more physical activity you can do, the better your overall health will be.  What kinds of activity can help you stay healthy?  Being more active will make your daily activities easier. Physical activity includes planned exercise and things you do in daily life. There are four types of activity:  Aerobic.  Doing aerobic activity makes your heart and lungs strong.  Includes walking, dancing, and gardening.  Aim for at least 2½ hours spread throughout the week.  It improves your energy and can help you sleep better.  Muscle-strengthening.  This type of activity can help maintain muscle and strengthen bones.  Includes climbing stairs, using resistance bands, and lifting or carrying heavy loads.  Aim for at least twice a week.  It can help protect the knees and other joints.  Stretching.  Stretching gives you better range of motion in joints and muscles.  Includes upper arm stretches, calf stretches, and gentle yoga.  Aim for at least twice a week, preferably after your muscles are warmed up from other activities.  It can help you function better in daily life.  Balancing.  This helps you stay coordinated and have good posture.  Includes heel-to-toe walking, monica chi, and certain types of yoga.  Aim for at least 3 days a week.  It can reduce your risk of falling.  Even if you have a hard time meeting the recommendations, it's better to be more active

## 2025-03-14 NOTE — PROGRESS NOTES
History of Present Illness  Sepideh Morrissey Jr. is a 74 y.o. male who presents today for:    Chief Complaint   Patient presents with    Follow-up     6m follow up.  Pt reports his legs are getting stiff.    Medicare AWV       Past Medical History  Past Medical History:   Diagnosis Date    Controlled type 2 diabetes mellitus, without long-term current use of insulin ([facility]) 3/4/2021    HTN (hypertension)     Hyperlipidemia         Surgical History  No past surgical history on file.     Current Medications  Current Outpatient Medications   Medication Sig    pravastatin (PRAVACHOL) 40 MG tablet Take 1 tablet by mouth nightly    lisinopril-hydroCHLOROthiazide (PRINZIDE;ZESTORETIC) 10-12.5 MG per tablet TAKE 1 TABLET BY MOUTH DAILY    glipiZIDE (GLUCOTROL) 5 MG tablet TAKE HALF A TABLET BY MOUTH DAILY    ketorolac (TORADOL) 10 MG tablet Take 1 tablet by mouth every 8 hours as needed for Pain (Patient not taking: Reported on 3/14/2025)     No current facility-administered medications for this visit.       Allergies/Drug Reactions  No Known Allergies     Family History  Family History   Problem Relation Age of Onset    Other Father         Meningitis    Diabetes Mother         Social History  Social History     Tobacco Use    Smoking status: Never    Smokeless tobacco: Never   Substance Use Topics    Alcohol use: Yes        Health Maintenance   Topic Date Due    DTaP/Tdap/Td vaccine (1 - Tdap) Never done    Pneumococcal 50+ years Vaccine (1 of 2 - PCV) Never done    Shingles vaccine (1 of 2) Never done    Respiratory Syncytial Virus (RSV) Pregnant or age 60 yrs+ (1 - 1-dose 75+ series) Never done    Flu vaccine (1) Never done    COVID-19 Vaccine (4 - 2024-25 season) 09/01/2024    Annual Wellness Visit (Medicare Advantage)  01/01/2025    Depression Screen  04/08/2025    Diabetic Alb to Cr ratio (uACR) test  04/12/2025    Lipids  04/12/2025    GFR test (Diabetes, CKD 3-4, OR last GFR 15-59)  04/12/2025    Hepatitis C screen

## 2025-04-10 ENCOUNTER — HOSPITAL ENCOUNTER (OUTPATIENT)
Age: 75
Discharge: HOME | End: 2025-04-13

## 2025-04-10 LAB — LABCORP DRAW FEE: NORMAL

## 2025-04-10 PROCEDURE — 99001 SPECIMEN HANDLING PT-LAB: CPT

## 2025-04-11 DIAGNOSIS — E11.65 TYPE 2 DIABETES MELLITUS WITH HYPERGLYCEMIA, WITHOUT LONG-TERM CURRENT USE OF INSULIN (HCC): ICD-10-CM

## 2025-04-11 LAB
ALBUMIN SERPL-MCNC: 4.4 G/DL (ref 3.8–4.8)
ALBUMIN/CREAT UR: 3 MG/G CREAT (ref 0–29)
ALP SERPL-CCNC: 72 IU/L (ref 44–121)
ALT SERPL-CCNC: 15 IU/L (ref 0–44)
AST SERPL-CCNC: 18 IU/L (ref 0–40)
BASOPHILS # BLD AUTO: 0 X10E3/UL (ref 0–0.2)
BASOPHILS NFR BLD AUTO: 1 %
BILIRUB SERPL-MCNC: 0.3 MG/DL (ref 0–1.2)
BUN SERPL-MCNC: 7 MG/DL (ref 8–27)
BUN/CREAT SERPL: 7 (ref 10–24)
CALCIUM SERPL-MCNC: 9.4 MG/DL (ref 8.6–10.2)
CHLORIDE SERPL-SCNC: 101 MMOL/L (ref 96–106)
CHOLEST SERPL-MCNC: 122 MG/DL (ref 100–199)
CO2 SERPL-SCNC: 21 MMOL/L (ref 20–29)
CREAT SERPL-MCNC: 0.96 MG/DL (ref 0.76–1.27)
CREAT UR-MCNC: 161 MG/DL
EGFRCR SERPLBLD CKD-EPI 2021: 82 ML/MIN/1.73
EOSINOPHIL # BLD AUTO: 0 X10E3/UL (ref 0–0.4)
EOSINOPHIL NFR BLD AUTO: 1 %
ERYTHROCYTE [DISTWIDTH] IN BLOOD BY AUTOMATED COUNT: 13.5 % (ref 11.6–15.4)
GLOBULIN SER CALC-MCNC: 2.9 G/DL (ref 1.5–4.5)
GLUCOSE SERPL-MCNC: 112 MG/DL (ref 70–99)
HCT VFR BLD AUTO: 44.2 % (ref 37.5–51)
HDLC SERPL-MCNC: 40 MG/DL
HGB BLD-MCNC: 14.6 G/DL (ref 13–17.7)
IMM GRANULOCYTES # BLD AUTO: 0 X10E3/UL (ref 0–0.1)
IMM GRANULOCYTES NFR BLD AUTO: 0 %
LDLC SERPL CALC-MCNC: 69 MG/DL (ref 0–99)
LYMPHOCYTES # BLD AUTO: 2.5 X10E3/UL (ref 0.7–3.1)
LYMPHOCYTES NFR BLD AUTO: 57 %
MCH RBC QN AUTO: 28.3 PG (ref 26.6–33)
MCHC RBC AUTO-ENTMCNC: 33 G/DL (ref 31.5–35.7)
MCV RBC AUTO: 86 FL (ref 79–97)
MICROALBUMIN UR-MCNC: 4.4 UG/ML
MONOCYTES # BLD AUTO: 0.4 X10E3/UL (ref 0.1–0.9)
MONOCYTES NFR BLD AUTO: 10 %
NEUTROPHILS # BLD AUTO: 1.4 X10E3/UL (ref 1.4–7)
NEUTROPHILS NFR BLD AUTO: 31 %
PLATELET # BLD AUTO: 285 X10E3/UL (ref 150–450)
POTASSIUM SERPL-SCNC: 3.9 MMOL/L (ref 3.5–5.2)
PROT SERPL-MCNC: 7.3 G/DL (ref 6–8.5)
PSA SERPL-MCNC: 3.5 NG/ML (ref 0–4)
RBC # BLD AUTO: 5.16 X10E6/UL (ref 4.14–5.8)
SODIUM SERPL-SCNC: 139 MMOL/L (ref 134–144)
SPECIMEN STATUS REPORT: NORMAL
TRIGL SERPL-MCNC: 60 MG/DL (ref 0–149)
TSH SERPL DL<=0.005 MIU/L-ACNC: 1.1 UIU/ML (ref 0.45–4.5)
VIT B12 SERPL-MCNC: 706 PG/ML (ref 232–1245)
VLDLC SERPL CALC-MCNC: 13 MG/DL (ref 5–40)
WBC # BLD AUTO: 4.3 X10E3/UL (ref 3.4–10.8)

## 2025-04-14 RX ORDER — GLIPIZIDE 5 MG/1
2.5 TABLET ORAL DAILY
Qty: 45 TABLET | Refills: 1 | Status: SHIPPED | OUTPATIENT
Start: 2025-04-14

## 2025-04-22 RX ORDER — PRAVASTATIN SODIUM 40 MG
40 TABLET ORAL NIGHTLY
Qty: 90 TABLET | Refills: 1 | Status: SHIPPED | OUTPATIENT
Start: 2025-04-22

## 2025-04-24 DIAGNOSIS — I10 ESSENTIAL HYPERTENSION: ICD-10-CM

## 2025-04-24 RX ORDER — LISINOPRIL AND HYDROCHLOROTHIAZIDE 10; 12.5 MG/1; MG/1
1 TABLET ORAL DAILY
Qty: 90 TABLET | Refills: 1 | Status: SHIPPED | OUTPATIENT
Start: 2025-04-24

## 2025-05-17 ENCOUNTER — APPOINTMENT (OUTPATIENT)
Age: 75
End: 2025-05-17
Payer: MEDICARE

## 2025-05-17 ENCOUNTER — HOSPITAL ENCOUNTER (EMERGENCY)
Age: 75
Discharge: HOME | End: 2025-05-17
Attending: EMERGENCY MEDICINE
Payer: MEDICARE

## 2025-05-17 VITALS
HEART RATE: 75 BPM | TEMPERATURE: 98.2 F | SYSTOLIC BLOOD PRESSURE: 132 MMHG | WEIGHT: 169.8 LBS | DIASTOLIC BLOOD PRESSURE: 78 MMHG | RESPIRATION RATE: 16 BRPM | OXYGEN SATURATION: 100 % | BODY MASS INDEX: 25.15 KG/M2 | HEIGHT: 69 IN

## 2025-05-17 DIAGNOSIS — M17.12 PRIMARY OSTEOARTHRITIS OF LEFT KNEE: Primary | ICD-10-CM

## 2025-05-17 PROCEDURE — 73562 X-RAY EXAM OF KNEE 3: CPT

## 2025-05-17 PROCEDURE — 6370000000 HC RX 637 (ALT 250 FOR IP): Performed by: EMERGENCY MEDICINE

## 2025-05-17 PROCEDURE — 99283 EMERGENCY DEPT VISIT LOW MDM: CPT

## 2025-05-17 RX ORDER — CLINDAMYCIN HYDROCHLORIDE 150 MG/1
300 CAPSULE ORAL
Status: DISCONTINUED | OUTPATIENT
Start: 2025-05-17 | End: 2025-05-17

## 2025-05-17 RX ORDER — IBUPROFEN 400 MG/1
800 TABLET, FILM COATED ORAL
Status: COMPLETED | OUTPATIENT
Start: 2025-05-17 | End: 2025-05-17

## 2025-05-17 RX ORDER — TRAMADOL HYDROCHLORIDE 50 MG/1
50 TABLET ORAL EVERY 6 HOURS PRN
Qty: 20 TABLET | Refills: 0 | Status: SHIPPED | OUTPATIENT
Start: 2025-05-17 | End: 2025-05-22

## 2025-05-17 RX ORDER — TRAMADOL HYDROCHLORIDE 50 MG/1
50 TABLET ORAL
Status: COMPLETED | OUTPATIENT
Start: 2025-05-17 | End: 2025-05-17

## 2025-05-17 RX ADMIN — TRAMADOL HYDROCHLORIDE 50 MG: 50 TABLET, COATED ORAL at 17:25

## 2025-05-17 RX ADMIN — IBUPROFEN 800 MG: 400 TABLET, FILM COATED ORAL at 15:58

## 2025-05-17 ASSESSMENT — PAIN DESCRIPTION - LOCATION
LOCATION: KNEE
LOCATION: KNEE

## 2025-05-17 ASSESSMENT — LIFESTYLE VARIABLES
HOW MANY STANDARD DRINKS CONTAINING ALCOHOL DO YOU HAVE ON A TYPICAL DAY: 1 OR 2
HOW OFTEN DO YOU HAVE A DRINK CONTAINING ALCOHOL: 4 OR MORE TIMES A WEEK

## 2025-05-17 ASSESSMENT — PAIN SCALES - GENERAL
PAINLEVEL_OUTOF10: 3
PAINLEVEL_OUTOF10: 4

## 2025-05-17 ASSESSMENT — PAIN DESCRIPTION - ORIENTATION
ORIENTATION: LEFT
ORIENTATION: LEFT

## 2025-05-17 ASSESSMENT — PAIN - FUNCTIONAL ASSESSMENT: PAIN_FUNCTIONAL_ASSESSMENT: 0-10

## 2025-05-17 NOTE — ED TRIAGE NOTES
C/o left knee pain that began Thursday.  Denies injury to left knee, falls or recent long distance travel.  States that he had been walking prior to onset, but states walking not out of the ordinary for him.

## 2025-05-17 NOTE — ED PROVIDER NOTES
Vital Sign     Worried About Running Out of Food in the Last Year: Never true     Ran Out of Food in the Last Year: Never true   Transportation Needs: No Transportation Needs (3/14/2025)    PRAPARE - Transportation     Lack of Transportation (Medical): No     Lack of Transportation (Non-Medical): No   Physical Activity: Inactive (3/14/2025)    Exercise Vital Sign     Days of Exercise per Week: 0 days     Minutes of Exercise per Session: 0 min   Stress: Not on file   Social Connections: Not on file   Intimate Partner Violence: Not on file   Depression: Not at risk (3/14/2025)    PHQ-2     PHQ-2 Score: 0   Housing Stability: Low Risk  (3/14/2025)    Housing Stability Vital Sign     Unable to Pay for Housing in the Last Year: No     Number of Times Moved in the Last Year: 0     Homeless in the Last Year: No   Interpersonal Safety: Not on file   Utilities: Not At Risk (3/14/2025)    [facility] Utilities     Threatened with loss of utilities: No       Review of Systems     Negative except as listed above in HPI.    Physical Exam     Vitals:    05/17/25 1530   BP: 135/83   Pulse: 75   Resp: 16   Temp: 98.2 °F (36.8 °C)   TempSrc: Oral   SpO2: 98%   Weight: 77 kg (169 lb 12.8 oz)   Height: 1.74 m (5' 8.5\")       Constitutional: Well nourished, well developed, appears stated age. Alert and oriented.  HEENT: Normal cephalic/atraumatic.  neck supple without meningismus. PERRLA, no conjunctival injection. EOM intact, sclera anicteric.  Pharynx clear without exudates.  Cardiovascular: RRR, S1/S2, no murmurs, rubs, or gallops.  Warm, well-perfused extremities  Respiratory: Clear to auscultation bilaterally, no wheezing, rales, or rhonchi.  Unlabored respiratory effort  Abdominal: Soft, nontender, nondistended, no rebound, no guarding, no rigidity.  No CVA tenderness  Musculoskeletal: Full range of motion all extremities.  There is moderate swelling to the left knee.  Negative varus and valgus stress test.  Negative Lachman test.  No  medications (Pravastatin and possibly Atorvastatin)  Prediabetes  Supporting findings:  Patient reports being prediabetic      Plan:    Left Knee Pain with Swelling  Review X-ray results  Degenerative changes to the left knee noted.  Consider recommending over-the-counter pain medication (e.g., Tylenol) for symptom management  Discuss potential benefits of oral pain medication versus topical treatments  Educate on RICE (Rest, Ice, Compression, Elevation) method for knee swelling  Hypertension and Hypercholesterolemia  Continue current medications (Amlodipine, Pravastatin)  Clarify cholesterol medication regimen at next visit  Encourage patient to bring all medications to future appointments for review  Prediabetes  Reinforce importance of regular follow-ups with primary care physician (Dr. Negrito Manley)  Discuss lifestyle modifications for diabetes prevention  Follow-up  Schedule follow-up appointment to reassess knee condition and medication regimen  Advise patient to return sooner if symptoms worsen  Patient Education  Explain the importance of medication adherence for chronic conditions  Discuss the balance between pain management and medication use         Patient was given the following medications:  Medications   traMADol (ULTRAM) tablet 50 mg (has no administration in time range)   ibuprofen (ADVIL;MOTRIN) tablet 800 mg (800 mg Oral Given 5/17/25 1558)       CONSULTS: (Who and What was discussed)  None    Chronic Conditions: HTN, prediabetes, hyperlipidemia    Social Determinants affecting Dx or Tx: None    Records Reviewed (source and summary of external notes): Nursing Notes, Old Medical Records, and Previous Radiology Studies    @[email or web address]@    Critical Care Time: None    SCREENING TOOLS:  None    CLINICAL MANAGEMENT TOOLS:  Not Applicable    Positive and negative test results were discussed. My clinical assessment and recommendations were discussed. They agree with the plan of discharge. Return